# Patient Record
Sex: MALE | Race: WHITE | Employment: STUDENT | URBAN - METROPOLITAN AREA
[De-identification: names, ages, dates, MRNs, and addresses within clinical notes are randomized per-mention and may not be internally consistent; named-entity substitution may affect disease eponyms.]

---

## 2019-05-17 ENCOUNTER — HOSPITAL ENCOUNTER (INPATIENT)
Facility: CLINIC | Age: 24
LOS: 6 days | Discharge: HOME | End: 2019-05-24
Attending: INTERNAL MEDICINE | Admitting: PSYCHIATRY & NEUROLOGY
Payer: COMMERCIAL

## 2019-05-17 DIAGNOSIS — F11.20 HEROIN DEPENDENCE (H): ICD-10-CM

## 2019-05-17 DIAGNOSIS — F11.10 HEROIN ABUSE (H): ICD-10-CM

## 2019-05-17 DIAGNOSIS — F41.8 DEPRESSION WITH ANXIETY: Primary | ICD-10-CM

## 2019-05-17 DIAGNOSIS — L03.114 CELLULITIS OF LEFT HAND: ICD-10-CM

## 2019-05-17 LAB
ALBUMIN SERPL-MCNC: 3.3 G/DL (ref 3.4–5)
ALBUMIN UR-MCNC: 100 MG/DL
ALP SERPL-CCNC: 102 U/L (ref 40–150)
ALT SERPL W P-5'-P-CCNC: 54 U/L (ref 0–70)
AMPHETAMINES UR QL SCN: NEGATIVE
ANION GAP SERPL CALCULATED.3IONS-SCNC: 10 MMOL/L (ref 3–14)
APPEARANCE UR: ABNORMAL
AST SERPL W P-5'-P-CCNC: 81 U/L (ref 0–45)
BARBITURATES UR QL: NEGATIVE
BASOPHILS # BLD AUTO: 0 10E9/L (ref 0–0.2)
BASOPHILS NFR BLD AUTO: 0.1 %
BENZODIAZ UR QL: NEGATIVE
BILIRUB SERPL-MCNC: 1 MG/DL (ref 0.2–1.3)
BILIRUB UR QL STRIP: NEGATIVE
BUN SERPL-MCNC: 29 MG/DL (ref 7–30)
CALCIUM SERPL-MCNC: 10.1 MG/DL (ref 8.5–10.1)
CANNABINOIDS UR QL SCN: POSITIVE
CHLORIDE SERPL-SCNC: 81 MMOL/L (ref 94–109)
CO2 SERPL-SCNC: 36 MMOL/L (ref 20–32)
COCAINE UR QL: NEGATIVE
COLOR UR AUTO: YELLOW
CREAT SERPL-MCNC: 1.13 MG/DL (ref 0.66–1.25)
DIFFERENTIAL METHOD BLD: ABNORMAL
EOSINOPHIL # BLD AUTO: 0 10E9/L (ref 0–0.7)
EOSINOPHIL NFR BLD AUTO: 0 %
ERYTHROCYTE [DISTWIDTH] IN BLOOD BY AUTOMATED COUNT: 12.6 % (ref 10–15)
ETHANOL UR QL SCN: NEGATIVE
GFR SERPL CREATININE-BSD FRML MDRD: >90 ML/MIN/{1.73_M2}
GLUCOSE SERPL-MCNC: 115 MG/DL (ref 70–99)
GLUCOSE UR STRIP-MCNC: NEGATIVE MG/DL
GRAN CASTS #/AREA URNS LPF: 5 /LPF
HCT VFR BLD AUTO: 42.9 % (ref 40–53)
HGB BLD-MCNC: 14.8 G/DL (ref 13.3–17.7)
HGB UR QL STRIP: ABNORMAL
HYALINE CASTS #/AREA URNS LPF: 30 /LPF (ref 0–2)
IMM GRANULOCYTES # BLD: 0 10E9/L (ref 0–0.4)
IMM GRANULOCYTES NFR BLD: 0.3 %
KETONES UR STRIP-MCNC: 5 MG/DL
LEUKOCYTE ESTERASE UR QL STRIP: NEGATIVE
LYMPHOCYTES # BLD AUTO: 1 10E9/L (ref 0.8–5.3)
LYMPHOCYTES NFR BLD AUTO: 7.6 %
MCH RBC QN AUTO: 30.2 PG (ref 26.5–33)
MCHC RBC AUTO-ENTMCNC: 34.5 G/DL (ref 31.5–36.5)
MCV RBC AUTO: 88 FL (ref 78–100)
MONOCYTES # BLD AUTO: 0.2 10E9/L (ref 0–1.3)
MONOCYTES NFR BLD AUTO: 1.8 %
MUCOUS THREADS #/AREA URNS LPF: PRESENT /LPF
NEUTROPHILS # BLD AUTO: 11.9 10E9/L (ref 1.6–8.3)
NEUTROPHILS NFR BLD AUTO: 90.2 %
NITRATE UR QL: NEGATIVE
NRBC # BLD AUTO: 0 10*3/UL
NRBC BLD AUTO-RTO: 0 /100
OPIATES UR QL SCN: POSITIVE
PH UR STRIP: 5.5 PH (ref 5–7)
PLATELET # BLD AUTO: 416 10E9/L (ref 150–450)
POTASSIUM SERPL-SCNC: 3.2 MMOL/L (ref 3.4–5.3)
PROT SERPL-MCNC: 10 G/DL (ref 6.8–8.8)
RBC # BLD AUTO: 4.9 10E12/L (ref 4.4–5.9)
RBC #/AREA URNS AUTO: 6 /HPF (ref 0–2)
SODIUM SERPL-SCNC: 127 MMOL/L (ref 133–144)
SOURCE: ABNORMAL
SP GR UR STRIP: 1.02 (ref 1–1.03)
SQUAMOUS #/AREA URNS AUTO: 1 /HPF (ref 0–1)
UROBILINOGEN UR STRIP-MCNC: 2 MG/DL (ref 0–2)
WBC # BLD AUTO: 13.2 10E9/L (ref 4–11)
WBC #/AREA URNS AUTO: 28 /HPF (ref 0–5)
WBC CASTS #/AREA URNS LPF: 25 /LPF

## 2019-05-17 PROCEDURE — 80320 DRUG SCREEN QUANTALCOHOLS: CPT | Performed by: INTERNAL MEDICINE

## 2019-05-17 PROCEDURE — 80307 DRUG TEST PRSMV CHEM ANLYZR: CPT | Performed by: INTERNAL MEDICINE

## 2019-05-17 PROCEDURE — 99285 EMERGENCY DEPT VISIT HI MDM: CPT | Mod: 25 | Performed by: INTERNAL MEDICINE

## 2019-05-17 PROCEDURE — 81001 URINALYSIS AUTO W/SCOPE: CPT | Performed by: INTERNAL MEDICINE

## 2019-05-17 PROCEDURE — 76882 US LMTD JT/FCL EVL NVASC XTR: CPT | Performed by: INTERNAL MEDICINE

## 2019-05-17 PROCEDURE — 96375 TX/PRO/DX INJ NEW DRUG ADDON: CPT | Performed by: INTERNAL MEDICINE

## 2019-05-17 PROCEDURE — 25000128 H RX IP 250 OP 636: Performed by: INTERNAL MEDICINE

## 2019-05-17 PROCEDURE — 10060 I&D ABSCESS SIMPLE/SINGLE: CPT | Mod: 59 | Performed by: INTERNAL MEDICINE

## 2019-05-17 PROCEDURE — 80053 COMPREHEN METABOLIC PANEL: CPT | Performed by: INTERNAL MEDICINE

## 2019-05-17 PROCEDURE — 25000132 ZZH RX MED GY IP 250 OP 250 PS 637: Performed by: INTERNAL MEDICINE

## 2019-05-17 PROCEDURE — 96365 THER/PROPH/DIAG IV INF INIT: CPT | Performed by: INTERNAL MEDICINE

## 2019-05-17 PROCEDURE — 85025 COMPLETE CBC W/AUTO DIFF WBC: CPT | Performed by: INTERNAL MEDICINE

## 2019-05-17 PROCEDURE — 96366 THER/PROPH/DIAG IV INF ADDON: CPT | Performed by: INTERNAL MEDICINE

## 2019-05-17 PROCEDURE — HZ2ZZZZ DETOXIFICATION SERVICES FOR SUBSTANCE ABUSE TREATMENT: ICD-10-PCS | Performed by: PSYCHIATRY & NEUROLOGY

## 2019-05-17 PROCEDURE — 76882 US LMTD JT/FCL EVL NVASC XTR: CPT | Mod: 26 | Performed by: INTERNAL MEDICINE

## 2019-05-17 PROCEDURE — 0J9K3ZZ DRAINAGE OF LEFT HAND SUBCUTANEOUS TISSUE AND FASCIA, PERCUTANEOUS APPROACH: ICD-10-PCS | Performed by: INTERNAL MEDICINE

## 2019-05-17 PROCEDURE — 25000125 ZZHC RX 250

## 2019-05-17 RX ORDER — LIDOCAINE HYDROCHLORIDE AND EPINEPHRINE 10; 10 MG/ML; UG/ML
INJECTION, SOLUTION INFILTRATION; PERINEURAL
Status: COMPLETED
Start: 2019-05-17 | End: 2019-05-17

## 2019-05-17 RX ORDER — LIDOCAINE HYDROCHLORIDE AND EPINEPHRINE 10; 10 MG/ML; UG/ML
30 INJECTION, SOLUTION INFILTRATION; PERINEURAL ONCE
Status: DISCONTINUED | OUTPATIENT
Start: 2019-05-17 | End: 2019-05-17 | Stop reason: RX

## 2019-05-17 RX ORDER — IBUPROFEN 600 MG/1
600 TABLET, FILM COATED ORAL ONCE
Status: COMPLETED | OUTPATIENT
Start: 2019-05-17 | End: 2019-05-17

## 2019-05-17 RX ORDER — CEFAZOLIN SODIUM 2 G/100ML
2 INJECTION, SOLUTION INTRAVENOUS ONCE
Status: COMPLETED | OUTPATIENT
Start: 2019-05-17 | End: 2019-05-17

## 2019-05-17 RX ORDER — ACETAMINOPHEN 325 MG/1
650 TABLET ORAL EVERY 4 HOURS PRN
Status: DISCONTINUED | OUTPATIENT
Start: 2019-05-17 | End: 2019-05-17

## 2019-05-17 RX ORDER — ONDANSETRON 2 MG/ML
4 INJECTION INTRAMUSCULAR; INTRAVENOUS ONCE
Status: DISCONTINUED | OUTPATIENT
Start: 2019-05-17 | End: 2019-05-17

## 2019-05-17 RX ORDER — ONDANSETRON 2 MG/ML
4 INJECTION INTRAMUSCULAR; INTRAVENOUS ONCE
Status: COMPLETED | OUTPATIENT
Start: 2019-05-17 | End: 2019-05-17

## 2019-05-17 RX ORDER — ACETAMINOPHEN 325 MG/1
650 TABLET ORAL EVERY 4 HOURS PRN
Status: DISCONTINUED | OUTPATIENT
Start: 2019-05-17 | End: 2019-05-18

## 2019-05-17 RX ORDER — LIDOCAINE HYDROCHLORIDE AND EPINEPHRINE 10; 10 MG/ML; UG/ML
1 INJECTION, SOLUTION INFILTRATION; PERINEURAL ONCE
Status: DISCONTINUED | OUTPATIENT
Start: 2019-05-17 | End: 2019-05-17 | Stop reason: RX

## 2019-05-17 RX ORDER — CEPHALEXIN 500 MG/1
500 CAPSULE ORAL EVERY 6 HOURS SCHEDULED
Status: DISCONTINUED | OUTPATIENT
Start: 2019-05-18 | End: 2019-05-24 | Stop reason: HOSPADM

## 2019-05-17 RX ORDER — ONDANSETRON 8 MG/1
8 TABLET, ORALLY DISINTEGRATING ORAL ONCE
Status: COMPLETED | OUTPATIENT
Start: 2019-05-17 | End: 2019-05-18

## 2019-05-17 RX ORDER — ACETAMINOPHEN 325 MG/1
650 TABLET ORAL ONCE
Status: COMPLETED | OUTPATIENT
Start: 2019-05-17 | End: 2019-05-17

## 2019-05-17 RX ADMIN — ACETAMINOPHEN 650 MG: 325 TABLET, FILM COATED ORAL at 23:58

## 2019-05-17 RX ADMIN — IBUPROFEN 600 MG: 600 TABLET ORAL at 21:04

## 2019-05-17 RX ADMIN — ACETAMINOPHEN 650 MG: 325 TABLET, FILM COATED ORAL at 21:04

## 2019-05-17 RX ADMIN — IBUPROFEN 600 MG: 600 TABLET ORAL at 23:59

## 2019-05-17 RX ADMIN — CEFAZOLIN SODIUM 2 G: 2 INJECTION, SOLUTION INTRAVENOUS at 18:12

## 2019-05-17 RX ADMIN — ONDANSETRON 4 MG: 2 INJECTION INTRAMUSCULAR; INTRAVENOUS at 18:14

## 2019-05-17 RX ADMIN — CEPHALEXIN 500 MG: 500 CAPSULE ORAL at 23:57

## 2019-05-17 RX ADMIN — LIDOCAINE HYDROCHLORIDE AND EPINEPHRINE: 10; 10 INJECTION, SOLUTION INFILTRATION; PERINEURAL at 18:30

## 2019-05-17 ASSESSMENT — ENCOUNTER SYMPTOMS
DYSPHORIC MOOD: 1
NAUSEA: 1

## 2019-05-17 NOTE — ED PROVIDER NOTES
[facility] [place] ([facility])    5/17/19     [facility] 2 5:10 PM   History     Chief Complaint   Patient presents with     Addiction Problem     seeking detox from IV heroin abuse, last used this am.      The history is provided by the patient and medical records.     John Ojeda is a 23 year old male who presents seeking opiate detox. He has a history of heroin use x 2-3 years. He has been using approximately 1 gram a day IV with occasional Suboxone use. He did call ahead for detox be and has one on hold. As for physical complaints he notes that for past week he has had left hand redness and swelling around injection site. He is not currently on any antibiotics. No allergies to antibiotics. He feels helpless and hopeless but no suicidal ideation or plan. He is experiencing some withdrawal symptoms, has nausea at this time. Patient's last Tdap was 4 months ago.     I have reviewed the Medications, Allergies, Past Medical and Surgical History, and Social History in the [employer] system.  PAST MEDICAL HISTORY: History reviewed. No pertinent past medical history.    PAST SURGICAL HISTORY: History reviewed. No pertinent surgical history.    FAMILY HISTORY: History reviewed. No pertinent family history.    SOCIAL HISTORY:   Social History     Tobacco Use     Smoking status: Never Smoker     Smokeless tobacco: Never Used   Substance Use Topics     Alcohol use: Yes       Patient's Medications   New Prescriptions    No medications on file   Previous Medications    BUPRENORPHINE-NALOXONE (ZUBSOLV) 5.7-1.4 MG SUBLINGUAL TABLET    Place 1 tablet under the tongue 2 times daily   Modified Medications    No medications on file   Discontinued Medications    No medications on file        No Known Allergies     Review of Systems   Gastrointestinal: Positive for nausea.   Psychiatric/Behavioral: Positive for dysphoric mood. Negative for suicidal ideas.   All other systems reviewed and are negative.    Physical  Exam   BP: 145/85  Pulse: 136  Temp: 99.9  F (37.7  C)  Resp: 16  Weight: 87.5 kg (193 lb)  SpO2: 94 %    Physical Exam   Constitutional: He is oriented to person, place, and time. No distress.   HENT:   Head: Atraumatic.   Mouth/Throat: Oropharynx is clear and moist.   Eyes: Pupils are equal, round, and reactive to light. No scleral icterus.   Neck: Neck supple. No JVD present.   Cardiovascular: Regular rhythm, normal heart sounds and intact distal pulses. Tachycardia present. Exam reveals no gallop and no friction rub.   No murmur heard.  Pulmonary/Chest: Effort normal and breath sounds normal. No stridor. No respiratory distress. He has no wheezes. He has no rales. He exhibits no tenderness.   Abdominal: Soft. Bowel sounds are normal. He exhibits no distension and no mass. There is no tenderness. There is no rebound and no guarding. No hernia.   Musculoskeletal: He exhibits no edema or tenderness.   Neurological: He is alert and oriented to person, place, and time. No cranial nerve deficit.   Skin: Skin is warm. Lesion noted. No rash noted. He is not diaphoretic. There is erythema.            ED Course        Incision + drainage  Date/Time: 5/17/2019 10:51 PM  Performed by: Vladimir Morfin MD  Authorized by: Vladimir Morfin MD     Consent:     Consent obtained:  Verbal    Consent given by:  Patient    Risks discussed:  Bleeding    Alternatives discussed:  No treatment  Location:     Type:  Abscess    Location:  Upper extremity    Upper extremity location:  Hand    Hand location:  L hand  Pre-procedure details:     Skin preparation:  Betadine  Anesthesia (see MAR for exact dosages):     Anesthesia method:  Local infiltration    Local anesthetic:  Lidocaine 1% WITH epi  Procedure type:     Complexity:  Simple  Procedure details:     Incision types:  Single straight    Incision depth:  Dermal    Scalpel blade:  11    Wound management:  Probed and deloculated    Drainage:  Purulent    Drainage amount:  Scant    Wound  treatment:  Wound left open    Packing materials:  None  Post-procedure details:     Patient tolerance of procedure:  Tolerated well, no immediate complications      Results for orders placed during the hospital encounter of 05/17/19   POC US SOFT TISSUE    Impression Limited Soft Tissue Ultrasound, performed and interpreted by me.    Indication:  Skin redness warmth pain. Evaluate for cellulitis vs abscess.     Body location: left upper extremity    Findings:  There is cobblestoning suggestive of cellulitis in the evaluated area. There is a fluid collection measuring to suggest abscess. No foreign body identified    IMPRESSION: Abscess and Cellulitis            Results for orders placed or performed during the hospital encounter of 05/17/19 (from the past 24 hour(s))   POC US SOFT TISSUE     Status: None    Collection Time: 05/17/19  5:12 PM    Impression    Limited Soft Tissue Ultrasound, performed and interpreted by me.    Indication:  Skin redness warmth pain. Evaluate for cellulitis vs abscess.     Body location: left upper extremity    Findings:  There is cobblestoning suggestive of cellulitis in the evaluated area. There is a fluid collection measuring to suggest abscess. No foreign body identified    IMPRESSION: Abscess and Cellulitis       CBC with platelets differential     Status: Abnormal    Collection Time: 05/17/19  6:17 PM   Result Value Ref Range    WBC 13.2 (H) 4.0 - 11.0 10e9/L    RBC Count 4.90 4.4 - 5.9 10e12/L    Hemoglobin 14.8 13.3 - 17.7 g/dL    Hematocrit 42.9 40.0 - 53.0 %    MCV 88 78 - 100 fl    MCH 30.2 26.5 - 33.0 pg    MCHC 34.5 31.5 - 36.5 g/dL    RDW 12.6 10.0 - 15.0 %    Platelet Count 416 150 - 450 10e9/L    Diff Method Automated Method     % Neutrophils 90.2 %    % Lymphocytes 7.6 %    % Monocytes 1.8 %    % Eosinophils 0.0 %    % Basophils 0.1 %    % Immature Granulocytes 0.3 %    Nucleated RBCs 0 0 /100    Absolute Neutrophil 11.9 (H) 1.6 - 8.3 10e9/L    Absolute Lymphocytes 1.0  0.8 - 5.3 10e9/L    Absolute Monocytes 0.2 0.0 - 1.3 10e9/L    Absolute Eosinophils 0.0 0.0 - 0.7 10e9/L    Absolute Basophils 0.0 0.0 - 0.2 10e9/L    Abs Immature Granulocytes 0.0 0 - 0.4 10e9/L    Absolute Nucleated RBC 0.0    Comprehensive metabolic panel     Status: Abnormal    Collection Time: 05/17/19  6:17 PM   Result Value Ref Range    Sodium 127 (L) 133 - 144 mmol/L    Potassium 3.2 (L) 3.4 - 5.3 mmol/L    Chloride 81 (L) 94 - 109 mmol/L    Carbon Dioxide 36 (H) 20 - 32 mmol/L    Anion Gap 10 3 - 14 mmol/L    Glucose 115 (H) 70 - 99 mg/dL    Urea Nitrogen 29 7 - 30 mg/dL    Creatinine 1.13 0.66 - 1.25 mg/dL    GFR Estimate >90 >60 mL/min/[1.73_m2]    GFR Estimate If Black >90 >60 mL/min/[1.73_m2]    Calcium 10.1 8.5 - 10.1 mg/dL    Bilirubin Total 1.0 0.2 - 1.3 mg/dL    Albumin 3.3 (L) 3.4 - 5.0 g/dL    Protein Total 10.0 (H) 6.8 - 8.8 g/dL    Alkaline Phosphatase 102 40 - 150 U/L    ALT 54 0 - 70 U/L    AST 81 (H) 0 - 45 U/L   Drug abuse screen 6 urine (tox)     Status: Abnormal    Collection Time: 05/17/19  8:43 PM   Result Value Ref Range    Amphetamine Qual Urine Negative NEG^Negative    Barbiturates Qual Urine Negative NEG^Negative    Benzodiazepine Qual Urine Negative NEG^Negative    Cannabinoids Qual Urine Positive (A) NEG^Negative    Cocaine Qual Urine Negative NEG^Negative    Ethanol Qual Urine Negative NEG^Negative    Opiates Qualitative Urine Positive (A) NEG^Negative   UA with Microscopic     Status: Abnormal    Collection Time: 05/17/19  8:43 PM   Result Value Ref Range    Color Urine Yellow     Appearance Urine Slightly Cloudy     Glucose Urine Negative NEG^Negative mg/dL    Bilirubin Urine Negative NEG^Negative    Ketones Urine 5 (A) NEG^Negative mg/dL    Specific Gravity Urine 1.025 1.003 - 1.035    Blood Urine Trace (A) NEG^Negative    pH Urine 5.5 5.0 - 7.0 pH    Protein Albumin Urine 100 (A) NEG^Negative mg/dL    Urobilinogen mg/dL 2.0 0.0 - 2.0 mg/dL    Nitrite Urine Negative  NEG^Negative    Leukocyte Esterase Urine Negative NEG^Negative    Source Midstream Urine     WBC Urine 28 (H) 0 - 5 /HPF    RBC Urine 6 (H) 0 - 2 /HPF    Squamous Epithelial /HPF Urine 1 0 - 1 /HPF    Mucous Urine Present (A) NEG^Negative /LPF    Hyaline Casts 30 (H) 0 - 2 /LPF    Wbc Cast 25 (A) NEG^Negative /LPF    Granular Casts 5 (A) NEG^Negative /LPF           Results for orders placed or performed during the hospital encounter of 05/17/19 (from the past 24 hour(s))   POC US SOFT TISSUE    Impression    Limited Soft Tissue Ultrasound, performed and interpreted by me.    Indication:  Skin redness warmth pain. Evaluate for cellulitis vs abscess.     Body location: left upper extremity    Findings:  There is cobblestoning suggestive of cellulitis in the evaluated area. There is a fluid collection measuring to suggest abscess. No foreign body identified    IMPRESSION: Abscess and Cellulitis       CBC with platelets differential   Result Value Ref Range    WBC 13.2 (H) 4.0 - 11.0 10e9/L    RBC Count 4.90 4.4 - 5.9 10e12/L    Hemoglobin 14.8 13.3 - 17.7 g/dL    Hematocrit 42.9 40.0 - 53.0 %    MCV 88 78 - 100 fl    MCH 30.2 26.5 - 33.0 pg    MCHC 34.5 31.5 - 36.5 g/dL    RDW 12.6 10.0 - 15.0 %    Platelet Count 416 150 - 450 10e9/L    Diff Method Automated Method     % Neutrophils 90.2 %    % Lymphocytes 7.6 %    % Monocytes 1.8 %    % Eosinophils 0.0 %    % Basophils 0.1 %    % Immature Granulocytes 0.3 %    Nucleated RBCs 0 0 /100    Absolute Neutrophil 11.9 (H) 1.6 - 8.3 10e9/L    Absolute Lymphocytes 1.0 0.8 - 5.3 10e9/L    Absolute Monocytes 0.2 0.0 - 1.3 10e9/L    Absolute Eosinophils 0.0 0.0 - 0.7 10e9/L    Absolute Basophils 0.0 0.0 - 0.2 10e9/L    Abs Immature Granulocytes 0.0 0 - 0.4 10e9/L    Absolute Nucleated RBC 0.0    Comprehensive metabolic panel   Result Value Ref Range    Sodium 127 (L) 133 - 144 mmol/L    Potassium 3.2 (L) 3.4 - 5.3 mmol/L    Chloride 81 (L) 94 - 109 mmol/L    Carbon Dioxide 36 (H)  20 - 32 mmol/L    Anion Gap 10 3 - 14 mmol/L    Glucose 115 (H) 70 - 99 mg/dL    Urea Nitrogen 29 7 - 30 mg/dL    Creatinine 1.13 0.66 - 1.25 mg/dL    GFR Estimate >90 >60 mL/min/[1.73_m2]    GFR Estimate If Black >90 >60 mL/min/[1.73_m2]    Calcium 10.1 8.5 - 10.1 mg/dL    Bilirubin Total 1.0 0.2 - 1.3 mg/dL    Albumin 3.3 (L) 3.4 - 5.0 g/dL    Protein Total 10.0 (H) 6.8 - 8.8 g/dL    Alkaline Phosphatase 102 40 - 150 U/L    ALT 54 0 - 70 U/L    AST 81 (H) 0 - 45 U/L   Drug abuse screen 6 urine (tox)   Result Value Ref Range    Amphetamine Qual Urine Negative NEG^Negative    Barbiturates Qual Urine Negative NEG^Negative    Benzodiazepine Qual Urine Negative NEG^Negative    Cannabinoids Qual Urine Positive (A) NEG^Negative    Cocaine Qual Urine Negative NEG^Negative    Ethanol Qual Urine Negative NEG^Negative    Opiates Qualitative Urine Positive (A) NEG^Negative   UA with Microscopic   Result Value Ref Range    Color Urine Yellow     Appearance Urine Slightly Cloudy     Glucose Urine Negative NEG^Negative mg/dL    Bilirubin Urine Negative NEG^Negative    Ketones Urine 5 (A) NEG^Negative mg/dL    Specific Gravity Urine 1.025 1.003 - 1.035    Blood Urine Trace (A) NEG^Negative    pH Urine 5.5 5.0 - 7.0 pH    Protein Albumin Urine 100 (A) NEG^Negative mg/dL    Urobilinogen mg/dL 2.0 0.0 - 2.0 mg/dL    Nitrite Urine Negative NEG^Negative    Leukocyte Esterase Urine Negative NEG^Negative    Source Midstream Urine     WBC Urine 28 (H) 0 - 5 /HPF    RBC Urine 6 (H) 0 - 2 /HPF    Squamous Epithelial /HPF Urine 1 0 - 1 /HPF    Mucous Urine Present (A) NEG^Negative /LPF    Hyaline Casts 30 (H) 0 - 2 /LPF    Wbc Cast 25 (A) NEG^Negative /LPF    Granular Casts 5 (A) NEG^Negative /LPF        Medications   ondansetron (ZOFRAN-ODT) ODT tab 8 mg (8 mg Oral Not Given 5/17/19 1820)   cephALEXin (KEFLEX) capsule 500 mg (has no administration in time range)   ceFAZolin (ANCEF) intermittent infusion 2 g in 100 mL dextrose PRE-MIX (2 g  Intravenous Given 5/17/19 1812)   ondansetron (ZOFRAN) injection 4 mg (4 mg Intravenous Given 5/17/19 1814)   lidocaine 1% with EPINEPHrine 1:100,000 1 %-1:[id number] injection (  Given 5/17/19 1830)   ibuprofen (ADVIL/MOTRIN) tablet 600 mg (600 mg Oral Given 5/17/19 2104)   acetaminophen (TYLENOL) tablet 650 mg (650 mg Oral Given 5/17/19 2104)         Assessments & Plan (with Medical Decision Making)  Heroine abuse and dependence, upto one gram a day with IV, last use this am now mild withdraw with nausea, no SI, admit to Detox.  Left hand abscess/celluliits-ancef 2 gram and I and D done, continue keflex for 7 days.       I have reviewed the nursing notes.    I have reviewed the findings, diagnosis, plan and need for follow up with the patient.       Medication List      There are no discharge medications for this visit.         Final diagnoses:   Heroin abuse (H)   Heroin dependence (H)   Cellulitis of left hand     I, Pat Espinosa, am serving as a trained medical scribe to document services personally performed by Vladimir Morfin MD based on the provider's statements to me on May 17, 2019.  This document has been checked and approved by the attending provider.    I, Vladimir Morfin MD, was physically present and have reviewed and verified the accuracy of this note documented by Pat Espinosa, medical scribe.     5/17/2019   [facility], [place], EMERGENCY DEPARTMENT     Vladimir Morfin MD  05/17/19 [id number]

## 2019-05-18 PROBLEM — F19.10 SUBSTANCE ABUSE (H): Status: ACTIVE | Noted: 2019-05-18

## 2019-05-18 PROCEDURE — 99222 1ST HOSP IP/OBS MODERATE 55: CPT | Mod: AI | Performed by: PSYCHIATRY & NEUROLOGY

## 2019-05-18 PROCEDURE — H2035 A/D TX PROGRAM, PER HOUR: HCPCS | Mod: HQ

## 2019-05-18 PROCEDURE — 25000132 ZZH RX MED GY IP 250 OP 250 PS 637: Performed by: INTERNAL MEDICINE

## 2019-05-18 PROCEDURE — 25000132 ZZH RX MED GY IP 250 OP 250 PS 637: Performed by: PSYCHIATRY & NEUROLOGY

## 2019-05-18 PROCEDURE — 12800008 ZZH R&B CD ADULT

## 2019-05-18 PROCEDURE — 25000131 ZZH RX MED GY IP 250 OP 636 PS 637: Performed by: INTERNAL MEDICINE

## 2019-05-18 PROCEDURE — 25000131 ZZH RX MED GY IP 250 OP 636 PS 637: Performed by: PSYCHIATRY & NEUROLOGY

## 2019-05-18 RX ORDER — ONDANSETRON 4 MG/1
4 TABLET, ORALLY DISINTEGRATING ORAL EVERY 6 HOURS PRN
Status: DISCONTINUED | OUTPATIENT
Start: 2019-05-18 | End: 2019-05-18

## 2019-05-18 RX ORDER — CLONIDINE HYDROCHLORIDE 0.1 MG/1
0.1 TABLET ORAL 3 TIMES DAILY PRN
Status: DISCONTINUED | OUTPATIENT
Start: 2019-05-18 | End: 2019-05-24 | Stop reason: HOSPADM

## 2019-05-18 RX ORDER — ACETAMINOPHEN 325 MG/1
650 TABLET ORAL EVERY 4 HOURS PRN
Status: DISCONTINUED | OUTPATIENT
Start: 2019-05-18 | End: 2019-05-24 | Stop reason: HOSPADM

## 2019-05-18 RX ORDER — ONDANSETRON 4 MG/1
4-8 TABLET, ORALLY DISINTEGRATING ORAL EVERY 8 HOURS PRN
Status: DISCONTINUED | OUTPATIENT
Start: 2019-05-18 | End: 2019-05-24 | Stop reason: HOSPADM

## 2019-05-18 RX ORDER — BUPRENORPHINE 2 MG/1
4 TABLET SUBLINGUAL 3 TIMES DAILY
Status: DISCONTINUED | OUTPATIENT
Start: 2019-05-18 | End: 2019-05-24 | Stop reason: HOSPADM

## 2019-05-18 RX ORDER — CLONIDINE HYDROCHLORIDE 0.1 MG/1
0.1 TABLET ORAL 2 TIMES DAILY PRN
Status: DISCONTINUED | OUTPATIENT
Start: 2019-05-18 | End: 2019-05-18

## 2019-05-18 RX ORDER — BISACODYL 10 MG
10 SUPPOSITORY, RECTAL RECTAL DAILY PRN
Status: DISCONTINUED | OUTPATIENT
Start: 2019-05-18 | End: 2019-05-24 | Stop reason: HOSPADM

## 2019-05-18 RX ORDER — POTASSIUM CHLORIDE 750 MG/1
40 TABLET, EXTENDED RELEASE ORAL ONCE
Status: COMPLETED | OUTPATIENT
Start: 2019-05-18 | End: 2019-05-18

## 2019-05-18 RX ORDER — IBUPROFEN 600 MG/1
600 TABLET, FILM COATED ORAL EVERY 6 HOURS PRN
Status: DISCONTINUED | OUTPATIENT
Start: 2019-05-18 | End: 2019-05-24 | Stop reason: HOSPADM

## 2019-05-18 RX ORDER — HYDROXYZINE HYDROCHLORIDE 25 MG/1
25 TABLET, FILM COATED ORAL EVERY 4 HOURS PRN
Status: DISCONTINUED | OUTPATIENT
Start: 2019-05-18 | End: 2019-05-24 | Stop reason: HOSPADM

## 2019-05-18 RX ORDER — TRAZODONE HYDROCHLORIDE 50 MG/1
50 TABLET, FILM COATED ORAL
Status: DISCONTINUED | OUTPATIENT
Start: 2019-05-18 | End: 2019-05-24 | Stop reason: HOSPADM

## 2019-05-18 RX ORDER — BUPRENORPHINE 2 MG/1
4 TABLET SUBLINGUAL 2 TIMES DAILY
Status: DISCONTINUED | OUTPATIENT
Start: 2019-05-18 | End: 2019-05-18

## 2019-05-18 RX ORDER — ALUMINA, MAGNESIA, AND SIMETHICONE 2400; 2400; 240 MG/30ML; MG/30ML; MG/30ML
30 SUSPENSION ORAL EVERY 4 HOURS PRN
Status: DISCONTINUED | OUTPATIENT
Start: 2019-05-18 | End: 2019-05-24 | Stop reason: HOSPADM

## 2019-05-18 RX ADMIN — BUPRENORPHINE HYDROCHLORIDE 4 MG: 2 TABLET SUBLINGUAL at 20:24

## 2019-05-18 RX ADMIN — ACETAMINOPHEN 650 MG: 325 TABLET, FILM COATED ORAL at 20:24

## 2019-05-18 RX ADMIN — ONDANSETRON 8 MG: 8 TABLET, ORALLY DISINTEGRATING ORAL at 00:15

## 2019-05-18 RX ADMIN — CEPHALEXIN 500 MG: 500 CAPSULE ORAL at 23:49

## 2019-05-18 RX ADMIN — CEPHALEXIN 500 MG: 500 CAPSULE ORAL at 06:00

## 2019-05-18 RX ADMIN — BUPRENORPHINE HYDROCHLORIDE 4 MG: 2 TABLET SUBLINGUAL at 16:21

## 2019-05-18 RX ADMIN — BUPRENORPHINE HCL 4 MG: 2 TABLET SUBLINGUAL at 12:29

## 2019-05-18 RX ADMIN — HYDROXYZINE HYDROCHLORIDE 25 MG: 25 TABLET ORAL at 16:22

## 2019-05-18 RX ADMIN — CEPHALEXIN 500 MG: 500 CAPSULE ORAL at 20:24

## 2019-05-18 RX ADMIN — ONDANSETRON 4 MG: 4 TABLET, ORALLY DISINTEGRATING ORAL at 10:12

## 2019-05-18 RX ADMIN — ONDANSETRON 8 MG: 4 TABLET, ORALLY DISINTEGRATING ORAL at 13:59

## 2019-05-18 RX ADMIN — HYDROXYZINE HYDROCHLORIDE 25 MG: 25 TABLET ORAL at 20:24

## 2019-05-18 RX ADMIN — POTASSIUM CHLORIDE 40 MEQ: 750 TABLET, EXTENDED RELEASE ORAL at 01:13

## 2019-05-18 RX ADMIN — CEPHALEXIN 500 MG: 500 CAPSULE ORAL at 12:30

## 2019-05-18 ASSESSMENT — ACTIVITIES OF DAILY LIVING (ADL)
RETIRED_COMMUNICATION: 0-->UNDERSTANDS/COMMUNICATES WITHOUT DIFFICULTY
TOILETING: 0-->INDEPENDENT
BATHING: 0-->INDEPENDENT
FALL_HISTORY_WITHIN_LAST_SIX_MONTHS: NO
SWALLOWING: 0-->SWALLOWS FOODS/LIQUIDS WITHOUT DIFFICULTY
HYGIENE/GROOMING: INDEPENDENT
AMBULATION: 0-->INDEPENDENT
TRANSFERRING: 0-->INDEPENDENT
COGNITION: 0 - NO COGNITION ISSUES REPORTED
RETIRED_EATING: 0-->INDEPENDENT
DRESS: 0-->INDEPENDENT
ORAL_HYGIENE: INDEPENDENT

## 2019-05-18 ASSESSMENT — MIFFLIN-ST. JEOR: SCORE: 1896.64

## 2019-05-18 NOTE — PLAN OF CARE
S: Patient is a 23 year old male admitted for detox from Heroin.    B: Patient was admitted from our ED for Heroin abuse and detox. Patient said his primary drug of choice is heroin. He said he uses 1 gm of IV heroin daily and last use was on 5/17/2019 around 12:00 pm. His other drug choice include marijuana. He said he smokes marijuana daily and last use was also on 5 /17/2019 around 12:00 pm. He is also a smoker who smokes 1 pack of cigarrettes a day. Patient denied having any medical history. However, patient did have a abscess on his right hand which was drained in the ED and was ordered antibiotics for it. Area on Left hand is opened, dry, and swollen measuring L 1cm x W 0.4 cm. It was covered with Curity band-aid. He is here voluntarily.    A: Admission interview was completed. Doctor was called and updated. New orders were placed (see orders). Nicotine gum/lozenge were not ordered because patient said did not want any medical interventions at the moment. He said will let staff know if he would want it ordered in the future.     R: Continue to monitor and provide interventions for withdrawals. Will update if there are changes.

## 2019-05-18 NOTE — PHARMACY-ADMISSION MEDICATION HISTORY
"Admission medication history for the May 17, 2019 admission has been completed by pharmacy.     Interview sources:  patient, [place]  Record    Reliability of source: good, consistent with radha romano     Medication compliance: poor, been off \"for a while\"     Preferred Outpatient Pharmacy: [facility]    Changes made to PTA medication list (reason)  Added: Zubsolv    Additional medication history information:   [place] :  1) Zubsolv 5.7-1.4mg sublingual tablets dispensed on 4-12-19 for quantity #60 tablets (30 day supply)     Prior to Admission Medication List:  Prior to Admission medications    Medication Sig Last Dose Taking? Auth Provider   buprenorphine-naloxone (ZUBSOLV) 5.7-1.4 MG sublingual tablet Place 1 tablet under the tongue 2 times daily not taking  Unknown, Entered By History       Time spent: 15 minutes    Medication history completed by:   Lise Patel, PharmD, BCPP  [facility]  Available daily from 1-9 PM: phone [phone], ascom *[phone], pager [phone]    "

## 2019-05-18 NOTE — PROGRESS NOTES
Pt vomiting green bile 100 ml. Sea Bands applied. See order to increase Zofran. Pt will be receiving first dose of buprenorphine

## 2019-05-18 NOTE — PROGRESS NOTES
"CLINICAL NUTRITION SERVICES - ASSESSMENT NOTE     Nutrition Prescription    RECOMMENDATIONS FOR MDs/PROVIDERS TO ORDER:  -Monitor nausea control and potential need for adjustment in antinausea regimen.    Malnutrition Status:    -At least non-severe malnutrition in the context of acute illness.      Recommendations already ordered by Registered Dietitian (RD):  -None today    Future/Additional Recommendations:  -Monitor po intakes and tolerance. If po continues decreased, offer scheduled snacks or supplements.       REASON FOR ASSESSMENT  John Ojeda is a/an 23 year old male assessed by the dietitian for Admission Nutrition Risk Screen for reduced oral intake over the last month    NUTRITION HISTORY  Per pt has not eaten much for the past 1-2 weeks.  He states prior to this was missing some meals.  Pt resting in room-unable to obtain detailed nutrition hx.  No known food allergies.      CURRENT NUTRITION ORDERS  Diet: Regular  Intake/Tolerance:  Per chart review, pt experiencing nausea and emesis intermittently today.  He states will try to eat something later.        LABS  Labs reviewed  (5/17)  Na 127 (L)  K 3.4 (L)    MEDICATIONS  Medications reviewed  Per chart review, received Potassium Chloride   Zofran prn    ANTHROPOMETRICS  Height: 180.3 cm (5' 11\")  Most Recent Weight: 88.5 kg (195 lb)    IBW: 172 lbs  BMI: Overweight BMI 25-29.9  Weight History:   Per pt UBW is 210-215 lbs.  He states has lost 20 lbs  In the past 1-2 weeks.   No weight hx available to confirm.    Wt Readings from Last 10 Encounters:   05/18/19 88.5 kg (195 lb)       Dosing Weight: 89 kg    ASSESSED NUTRITION NEEDS  Estimated Energy Needs: 5077-2280 kcals/day (25 - 30 kcals/kg)  Justification: Maintenance  Estimated Protein Needs:  grams protein/day (1 - 1.2 grams of pro/kg)  Justification: Repletion/healing  Estimated Fluid Needs:  (1 mL/kcal)   Justification: Per provider pending fluid status    PHYSICAL FINDINGS  See " malnutrition section below.  Left hand abscess/celluliits    MALNUTRITION  % Intake: <75% for >7 days  % Weight Loss: >2% x 1 week  Subcutaneous Fat Loss: None observed  Muscle Loss: None observed  Fluid Accumulation/Edema: None noted  Malnutrition Diagnosis: At least non-severe malnutrition in the context of acute illness.      NUTRITION DIAGNOSIS  Inadequate oral intake related to decreased appetite with substance use and now N/V 2/2 w/d as evidenced by pt report of minimal po intakes over the past 1-2 weeks and severe weight loss.      INTERVENTIONS  Implementation  Nutrition Education:  Discussed option of nutrition supplements such as Boost Plus, but pt declined at this time.  Discussed foods better tolerated with N/V and snacks available on unit.      Goals  Patient to consume % of nutritionally adequate meal trays TID, or the equivalent with supplements/snacks.     Monitoring/Evaluation  Progress toward goals will be monitored and evaluated per protocol.    Jessica Johnson RD, LD

## 2019-05-18 NOTE — PROGRESS NOTES
Patient cow score 11 at 1600 and did feel better overall. Rested in bed and then at 1745 came to the desk to c/o feeling a wave of anxiety and VSS an 02 sats WNL. Did agree that he was sort of not breathing deeply and felt anxious. Was encouraged to go back to bed and to try to do deep slow breathing and was shown how. Did take his tray down to his room to eat.

## 2019-05-18 NOTE — PROGRESS NOTES
Case Management Note  5/18/2019    Briefly met with pt to discuss discharge plans. Pt reported he is not sure yet. He would like either treatment, sober living, or to return home. Patient is ill currently. Writer encouraged pt to think about what he'd like to do and seek out CM to talk further when he is feeling better. Pt understands.     Maira Ren, EITAN, [facility]

## 2019-05-18 NOTE — PROGRESS NOTES
05/18/19 0152   Patient Belongings   Did you bring any home meds/supplements to the hospital?  No   Patient Belongings remains with patient;other (see comments)   Patient Belongings Remaining with Patient shoes;clothing   Belongings Search Yes   Clothing Search Yes   Second Staff Vasu BRADSHAW   Comment None    Tote: belt, jacket, hat, grey shorts (2), green shorts, toiletries, lighter, keys, backpack  Bin: cell phone, wallet,   Security [id number]: social security card, [employer] ID, (2) mastercard, club m card, chick[employer]christiano-a card, mall [employer] badge, [employer] card, [place] drivers license, $462 cash  A               Admission:  I am responsible for any personal items that are not sent to the safe or pharmacy.  [facility] is not responsible for loss, theft or damage of any property in my possession.    Signature:  _________________________________ Date: _______  Time: _____                                              Staff Signature:  ____________________________ Date: ________  Time: _____      2nd Staff person, if patient is unable/unwilling to sign:    Signature: ________________________________ Date: ________  Time: _____     Discharge:  [facility] has returned all of my personal belongings:    Signature: _________________________________ Date: ________  Time: _____                                          Staff Signature:  ____________________________ Date: ________  Time: _____

## 2019-05-18 NOTE — PROGRESS NOTES
"Best practice has been constantly popping up with a message stating that \"vitals meets criteria for immediate notification of Internal Medicine for mandatory assessment within 30 minutes...\" And to notify Psychiatry as well. However, patient's vitals on this unit were /75   Pulse 68   Temp 98.6  F (37  C) (Oral)   Resp 16   Wt 87.5 kg (193 lb)   SpO2 91%   And did not show immediate notification of Internal Medicine and Psychiatry. Vitals previously in the ED were   Temp 97.9 F, pulse 66, /72 and SpO2 94% on room air.     Will continue to monitor and update if there are changes.  "

## 2019-05-18 NOTE — ED NOTES
ED to Behavioral Floor Handoff    SITUATION  John Ojeda is a 23 year old male who speaks English and lives in a home with girlfriend. The patient arrived in the ED by private car from home with a complaint of Addiction Problem (seeking detox from IV heroin abuse, last used this am. )  .The patient's current symptoms started/worsened 4 year(s) ago and during this time the symptoms have increased.   In the ED, pt was diagnosed with   Final diagnoses:   Heroin abuse (H)   Heroin dependence (H)   Cellulitis of left hand        Initial vitals were: BP: 145/85  Pulse: 136  Temp: 99.9  F (37.7  C)  Resp: 16  Weight: 87.5 kg (193 lb)  SpO2: 94 %   --------  Is the patient diabetic? No   If yes, last blood glucose? --     If yes, was this treated in the ED? --  --------  Is the patient inebriated (ETOH) No or Impaired on other substances? No  MSSA done? N/A  Last MSSA score: --    Were withdrawal symptoms treated? N/A  Does the patient have a seizure history? No. If yes, date of most recent seizure--  --------  Is the patient patient experiencing suicidal ideation? denies current or recent suicidal ideation     Homicidal ideation? denies current or recent homicidal ideation or behaviors.    Self-injurious behavior/urges? denies current or recent self injurious behavior or ideation.  ------  Was pt aggressive in the ED No  Was a code called No  Is the pt now cooperative? Yes  -------  Meds given in ED:   Medications   ondansetron (ZOFRAN-ODT) ODT tab 8 mg (8 mg Oral Not Given 5/17/19 1820)   ceFAZolin (ANCEF) intermittent infusion 2 g in 100 mL dextrose PRE-MIX (2 g Intravenous Given 5/17/19 1812)   ondansetron (ZOFRAN) injection 4 mg (4 mg Intravenous Given 5/17/19 1814)   lidocaine 1% with EPINEPHrine 1:100,000 1 %-1:[id number] injection (  Given 5/17/19 1830)   ibuprofen (ADVIL/MOTRIN) tablet 600 mg (600 mg Oral Given 5/17/19 2104)   acetaminophen (TYLENOL) tablet 650 mg (650 mg Oral Given 5/17/19 3756)      Family present  during ED course? No  Family currently present? No    BACKGROUND  Does the patient have a cognitive impairment or developmental disability? No  Allergies: No Known Allergies.   Social demographics are   Social History     Socioeconomic History     Marital status: Single     Spouse name: Not on file     Number of children: Not on file     Years of education: Not on file     Highest education level: Not on file   Occupational History     Not on file   Social Needs     Financial resource strain: Not on file     Food insecurity:     Worry: Not on file     Inability: Not on file     Transportation needs:     Medical: Not on file     Non-medical: Not on file   Tobacco Use     Smoking status: Not on file   Substance and Sexual Activity     Alcohol use: Not on file     Drug use: Not on file     Sexual activity: Not on file   Lifestyle     Physical activity:     Days per week: Not on file     Minutes per session: Not on file     Stress: Not on file   Relationships     Social connections:     Talks on phone: Not on file     Gets together: Not on file     Attends Anglican service: Not on file     Active member of club or organization: Not on file     Attends meetings of clubs or organizations: Not on file     Relationship status: Not on file     Intimate partner violence:     Fear of current or ex partner: Not on file     Emotionally abused: Not on file     Physically abused: Not on file     Forced sexual activity: Not on file   Other Topics Concern     Not on file   Social History Narrative     Not on file        ASSESSMENT  Labs results   Labs Ordered and Resulted from Time of ED Arrival Up to the Time of Departure from the ED   CBC WITH PLATELETS DIFFERENTIAL - Abnormal; Notable for the following components:       Result Value    WBC 13.2 (*)     Absolute Neutrophil 11.9 (*)     All other components within normal limits   COMPREHENSIVE METABOLIC PANEL - Abnormal; Notable for the following components:    Sodium 127 (*)      Potassium 3.2 (*)     Chloride 81 (*)     Carbon Dioxide 36 (*)     Glucose 115 (*)     Albumin 3.3 (*)     Protein Total 10.0 (*)     AST 81 (*)     All other components within normal limits   DRUG ABUSE SCREEN 6 CHEM DEP URINE ([facility])   ROUTINE UA WITH MICROSCOPIC      Imaging Studies:   Recent Results (from the past 24 hour(s))   POC US SOFT TISSUE    Impression    Limited Soft Tissue Ultrasound, performed and interpreted by me.    Indication:  Skin redness warmth pain. Evaluate for cellulitis vs abscess.     Body location: left upper extremity    Findings:  There is cobblestoning suggestive of cellulitis in the evaluated area. There is a fluid collection measuring to suggest abscess. No foreign body identified    IMPRESSION: Abscess and Cellulitis          Most recent vital signs BP (!) 149/91   Pulse 108   Temp 100.2  F (37.9  C) (Oral)   Resp 18   Wt 87.5 kg (193 lb)   SpO2 94%    Abnormal labs/tests/findings requiring intervention:---   Pain control: fair  Nausea control: fair    RECOMMENDATION  Are any infection precautions needed (MRSA, VRE, etc.)? No If yes, what infection? --  ---  Does the patient have mobility issues? independently. If yes, what device does the pt use? ---  ---  Is patient on 72 hour hold or commitment? No If on 72 hour hold, have hold and rights been given to patient? N/A  Are admitting orders written if after 10 p.m. ?N/A  Tasks needing to be completed:---     Komal Perez   ascom--    [phone] [facility]   [phone] [facility]

## 2019-05-18 NOTE — PLAN OF CARE
Pt reports less opiate withdrawal symptoms after receiving first dose of buprenorphine. Pt will received buprenorphine 4 mg TID this shift. Pt in bed. Nausea and vomiting off and on today. Second dose of Zofran given. Pt states he is still deciding on what to do after detox. Wants to talk to his parents. Left hand swollen. Pt has bandaid on. Is on keflex.

## 2019-05-18 NOTE — H&P
"Admitted:     05/17/2019      DATE OF SERVICE:  05/18/2019.      LEGAL STATUS AND REASON FOR ADMISSION:  The patient was admitted on a voluntary status after presenting to the Emergency Department seeking admission for opioid detox and is interested in a referral to CD treatment.      SOURCES FOR INTAKE:  The patient's interview and review of available medical records.      CHIEF COMPLAINT:  \"Heroin, wanted to get sober off of it.\"      HISTORY OF PRESENT ILLNESS:  John Ojeda is a 23-year-old  male with history of chemical dependency who presented to the Emergency Department seeking admission for heroin detoxification and is interested in referral to CD treatment.  The patient tells me that he lives with his girlfriend.  He works as a  at a restaurant.  He is currently in diversion program but does not have a .  He has been in treatment 4-5 times.  He completed all of them except for the first program.  More recently he was at [facility] about 1-1/2 years ago.  He received Suboxone maintenance through Dr. March's clinic at [facility].      The patient smokes about a pack of tobacco per day.  He was sober of heroin and marijuana for 16 months.  No history of cocaine or methamphetamine use.  He has not been taking his Suboxone consistently.  He tells me at times he skips it so he could get high on heroin.  He started actually abusing narcotics when he was 17 years old and transitioned to heroin by the age of 19.  He has been using via IV route, but does not share needles.  He actually has an abscess on his left arm.  No accidental overdoses reported.  He has been using up to a gram of heroin almost daily.  He is interested in Suboxone maintenance.  He is contemplating between referral for outpatient versus residential CD treatment.      PSYCHIATRIC HISTORY:  The patient saw a psychiatrist when he was attending CD treatment at [facility].  He does not have a therapist.  He has had " "remote history of alcohol bingeing but none recently.  No withdrawals and does not believe alcohol is a problem.  He has been diagnosed with \"drug-induced depression\", been on some antidepressants in the past, did not find them helpful and believes he needs them.  No prior suicidal attempts or self-harming behavior.  No prior inpatient hospitalization for mental illness.      In terms of mood symptoms and despite ongoing active substance use, the patient denied feeling depressed.  He reported some anxiety but mainly in the context of inability to maintain sobriety.  His sleep and appetite have been poor.  Energy, motivation, concentration and focus fluctuate and correlates greatly to heroin use.  He is hopeful.  Denied thought of suicide and urges to self-harm.  He reported no symptoms or history related to malinda, psychosis, PTSD, OCD or eating disorder.      PAST MEDICAL HISTORY:  The patient denies history of head trauma, concussion and seizures.  He underwent 2 wisdom teeth extractions.  He has an abscess on his left upper extremity at the site of IV drug use.  He has no medical allergies.      FAMILY HISTORY:  The patient tells me that his biological father has bipolar disorder, depression, anxiety and chemical dependency.      SOCIAL HISTORY:  The patient grew up in [place], [place], was raised by his mother who remarried when he was in high school.  He grew up with 5 siblings.  He graduated high school on time, attended 3 years of college, majoring in business.  No childhood abuse or neglect.  He is not .  He has no kids.  He currently lives with his girlfriend and works as a .  He is currently in a diversion program, does not have a .      PHYSICAL EXAMINATION:  Please refer to the physical exam done by Vladimir Morfin MD, done on 05/17/2019.      REVIEW OF SYSTEMS:  A 12-point review of systems was obtained and negative.  The patient reported that nausea and urges to vomit " "have subsided significantly since he received a first dose of Subutex.  Continues to feel tired and lethargic with some body ache, otherwise negative.      LABORATORY DATA:  Urine drug screen was positive for cannabinoids and opioids.  CBC and CMP were within normal limits except for sodium of 127, potassium 3.2, chloride 81, bicarbonate 36, albumin 3.3, total protein 10.0, AST 81, glucose 115, WBC 13.2 and absolute neutrophil 11.9.  UA was significant for bilirubin, protein, trace nitrate, WBC, RBC, mucus, Hyaline casts, granulated casts and WBC cast.      VITAL SIGNS ON ADMISSION:  Temperature 99, heart rate 76, blood pressure 127/76, respiratory rate 16.      PSYCHIATRIC EXAMINATION:  A 23-year-old  male appears his stated age, cooperative, pleasant and engaged, established good rapport and eye contact.  No major psychomotor abnormality, tics or tremors were noted except for mild restlessness.  Speech was normal pace, volume and clarity.  Gait and muscle tone within normal limits.  Mood described as \"better\" with full reactive and engaged affect.  His thought process linear and goal directed.  Thought content:  Denied current thoughts of suicide or urges of self-harm.  Denied hallucination and perceptual disturbances.  No paranoia or grandiosity looseness of association noted.  Sensorium was clear.  He was oriented to time, place, person and situation.  Immediate and remote memory intact.  Language and fund of knowledge adequate for age and level of training.  Concentration and focus intact.  Insight and judgment fair for safety at the current level of care.      DIAGNOSES:   1.  Opioid use disorder -- severe, with active withdrawals.     2.  Cannabis use disorder -- severe.   3.  Rule out substance-induced mood disorder.   4.  Cellulitis at the site of IV injection with possible abscess.      PLAN AND RECOMMENDATIONS:  The patient was admitted to detox on a voluntary status after presenting to the " Emergency Department seeking admission for opioid detox and referral to CD treatment.  The patient would like to resume Suboxone maintenance through Dr. March at [facility].  He is contemplating referral to residential CD treatment versus outpatient.      After reviewing for post-treatment plan and medication profile, patient agreed to the followin.  The patient was placed on opiate withdrawal protocol with Subutex taper starting at 4 mg b.i.d. due to elevated withdrawal symptoms and likely secondary to heavy use.  Subutex will being increased to 4 mg t.i.d.  The patient is interested in transitioning to Suboxone maintenance.  Will likely be issued on discharge at 4 versus 8 mg b.i.d.   2.  P.r.n. hydroxyzine for anxiety, clonidine for breakthrough withdrawals, and trazodone for insomnia.      Internal Medicine consult was obtained to address physical complaint and for health maintenance.  Psychosocial assessment was obtained by our clinical  who will assist with setting up post-discharge care.  The patient will need to complete CD assessment.      DISPOSITION:  To be discussed in future encounters.  The patient will be granted discharge when established safety in the community and completed detoxification.         ES CASTRO MD             D: 2019   T: 2019   MT: MI      Name:     DENIA TYLER   MRN:      [id number]        Account:      [id number]   :      1995        Admitted:     2019                   Document: [id number]       cc: Da March MD

## 2019-05-19 LAB
ALBUMIN SERPL-MCNC: 2.7 G/DL (ref 3.4–5)
ALBUMIN UR-MCNC: NEGATIVE MG/DL
ALP SERPL-CCNC: 78 U/L (ref 40–150)
ALT SERPL W P-5'-P-CCNC: 45 U/L (ref 0–70)
ANION GAP SERPL CALCULATED.3IONS-SCNC: 9 MMOL/L (ref 3–14)
APPEARANCE UR: CLEAR
AST SERPL W P-5'-P-CCNC: 58 U/L (ref 0–45)
BASOPHILS # BLD AUTO: 0 10E9/L (ref 0–0.2)
BASOPHILS NFR BLD AUTO: 0.1 %
BILIRUB SERPL-MCNC: 0.6 MG/DL (ref 0.2–1.3)
BILIRUB UR QL STRIP: NEGATIVE
BUN SERPL-MCNC: 20 MG/DL (ref 7–30)
CALCIUM SERPL-MCNC: 9.3 MG/DL (ref 8.5–10.1)
CHLORIDE SERPL-SCNC: 84 MMOL/L (ref 94–109)
CHOLEST SERPL-MCNC: 125 MG/DL
CO2 SERPL-SCNC: 37 MMOL/L (ref 20–32)
COLOR UR AUTO: YELLOW
CREAT SERPL-MCNC: 0.91 MG/DL (ref 0.66–1.25)
DIFFERENTIAL METHOD BLD: ABNORMAL
EOSINOPHIL # BLD AUTO: 0 10E9/L (ref 0–0.7)
EOSINOPHIL NFR BLD AUTO: 0.5 %
ERYTHROCYTE [DISTWIDTH] IN BLOOD BY AUTOMATED COUNT: 12.5 % (ref 10–15)
GFR SERPL CREATININE-BSD FRML MDRD: >90 ML/MIN/{1.73_M2}
GGT SERPL-CCNC: 63 U/L (ref 0–75)
GLUCOSE SERPL-MCNC: 106 MG/DL (ref 70–99)
GLUCOSE UR STRIP-MCNC: NEGATIVE MG/DL
HCT VFR BLD AUTO: 39.9 % (ref 40–53)
HDLC SERPL-MCNC: 21 MG/DL
HGB BLD-MCNC: 13.7 G/DL (ref 13.3–17.7)
HGB UR QL STRIP: NEGATIVE
IMM GRANULOCYTES # BLD: 0 10E9/L (ref 0–0.4)
IMM GRANULOCYTES NFR BLD: 0.3 %
KETONES UR STRIP-MCNC: 5 MG/DL
LDLC SERPL CALC-MCNC: 80 MG/DL
LEUKOCYTE ESTERASE UR QL STRIP: NEGATIVE
LYMPHOCYTES # BLD AUTO: 1.2 10E9/L (ref 0.8–5.3)
LYMPHOCYTES NFR BLD AUTO: 15.4 %
MCH RBC QN AUTO: 29.9 PG (ref 26.5–33)
MCHC RBC AUTO-ENTMCNC: 34.3 G/DL (ref 31.5–36.5)
MCV RBC AUTO: 87 FL (ref 78–100)
MONOCYTES # BLD AUTO: 0.2 10E9/L (ref 0–1.3)
MONOCYTES NFR BLD AUTO: 2 %
MUCOUS THREADS #/AREA URNS LPF: PRESENT /LPF
NEUTROPHILS # BLD AUTO: 6.1 10E9/L (ref 1.6–8.3)
NEUTROPHILS NFR BLD AUTO: 81.7 %
NITRATE UR QL: NEGATIVE
NONHDLC SERPL-MCNC: 104 MG/DL
NRBC # BLD AUTO: 0 10*3/UL
NRBC BLD AUTO-RTO: 0 /100
PH UR STRIP: 5.5 PH (ref 5–7)
PLATELET # BLD AUTO: 378 10E9/L (ref 150–450)
POTASSIUM SERPL-SCNC: 3.3 MMOL/L (ref 3.4–5.3)
PROT SERPL-MCNC: 8.5 G/DL (ref 6.8–8.8)
RBC # BLD AUTO: 4.58 10E12/L (ref 4.4–5.9)
RBC #/AREA URNS AUTO: <1 /HPF (ref 0–2)
SODIUM SERPL-SCNC: 130 MMOL/L (ref 133–144)
SOURCE: ABNORMAL
SP GR UR STRIP: 1.01 (ref 1–1.03)
SQUAMOUS #/AREA URNS AUTO: <1 /HPF (ref 0–1)
TRIGL SERPL-MCNC: 120 MG/DL
TSH SERPL DL<=0.005 MIU/L-ACNC: 0.64 MU/L (ref 0.4–4)
UROBILINOGEN UR STRIP-MCNC: NORMAL MG/DL (ref 0–2)
WBC # BLD AUTO: 7.5 10E9/L (ref 4–11)
WBC #/AREA URNS AUTO: 14 /HPF (ref 0–5)

## 2019-05-19 PROCEDURE — 25000131 ZZH RX MED GY IP 250 OP 636 PS 637: Performed by: PSYCHIATRY & NEUROLOGY

## 2019-05-19 PROCEDURE — 87389 HIV-1 AG W/HIV-1&-2 AB AG IA: CPT | Performed by: PSYCHIATRY & NEUROLOGY

## 2019-05-19 PROCEDURE — 25000132 ZZH RX MED GY IP 250 OP 250 PS 637: Performed by: INTERNAL MEDICINE

## 2019-05-19 PROCEDURE — 87086 URINE CULTURE/COLONY COUNT: CPT | Performed by: PHYSICIAN ASSISTANT

## 2019-05-19 PROCEDURE — 99207 ZZC CONSULT E&M CHANGED TO INITIAL LEVEL: CPT | Performed by: PHYSICIAN ASSISTANT

## 2019-05-19 PROCEDURE — 25000128 H RX IP 250 OP 636: Performed by: PHYSICIAN ASSISTANT

## 2019-05-19 PROCEDURE — 36415 COLL VENOUS BLD VENIPUNCTURE: CPT | Performed by: PSYCHIATRY & NEUROLOGY

## 2019-05-19 PROCEDURE — 80061 LIPID PANEL: CPT | Performed by: PSYCHIATRY & NEUROLOGY

## 2019-05-19 PROCEDURE — 25000132 ZZH RX MED GY IP 250 OP 250 PS 637: Performed by: PHYSICIAN ASSISTANT

## 2019-05-19 PROCEDURE — 80053 COMPREHEN METABOLIC PANEL: CPT | Performed by: PSYCHIATRY & NEUROLOGY

## 2019-05-19 PROCEDURE — 84443 ASSAY THYROID STIM HORMONE: CPT | Performed by: PSYCHIATRY & NEUROLOGY

## 2019-05-19 PROCEDURE — 82977 ASSAY OF GGT: CPT | Performed by: PSYCHIATRY & NEUROLOGY

## 2019-05-19 PROCEDURE — 87086 URINE CULTURE/COLONY COUNT: CPT | Performed by: PSYCHIATRY & NEUROLOGY

## 2019-05-19 PROCEDURE — 12800008 ZZH R&B CD ADULT

## 2019-05-19 PROCEDURE — 81001 URINALYSIS AUTO W/SCOPE: CPT | Performed by: PHYSICIAN ASSISTANT

## 2019-05-19 PROCEDURE — 25000132 ZZH RX MED GY IP 250 OP 250 PS 637: Performed by: PSYCHIATRY & NEUROLOGY

## 2019-05-19 PROCEDURE — 85025 COMPLETE CBC W/AUTO DIFF WBC: CPT | Performed by: PSYCHIATRY & NEUROLOGY

## 2019-05-19 PROCEDURE — 99222 1ST HOSP IP/OBS MODERATE 55: CPT | Performed by: PHYSICIAN ASSISTANT

## 2019-05-19 PROCEDURE — 86803 HEPATITIS C AB TEST: CPT | Performed by: PSYCHIATRY & NEUROLOGY

## 2019-05-19 RX ORDER — POTASSIUM CHLORIDE 1.5 G/1.58G
60 POWDER, FOR SOLUTION ORAL ONCE
Status: COMPLETED | OUTPATIENT
Start: 2019-05-19 | End: 2019-05-19

## 2019-05-19 RX ORDER — POTASSIUM CHLORIDE 1500 MG/1
60 TABLET, EXTENDED RELEASE ORAL ONCE
Status: COMPLETED | OUTPATIENT
Start: 2019-05-19 | End: 2019-05-19

## 2019-05-19 RX ORDER — PROCHLORPERAZINE 25 MG
25 SUPPOSITORY, RECTAL RECTAL EVERY 12 HOURS PRN
Status: DISCONTINUED | OUTPATIENT
Start: 2019-05-19 | End: 2019-05-24 | Stop reason: HOSPADM

## 2019-05-19 RX ORDER — LIDOCAINE 40 MG/G
CREAM TOPICAL
Status: DISCONTINUED | OUTPATIENT
Start: 2019-05-19 | End: 2019-05-24 | Stop reason: HOSPADM

## 2019-05-19 RX ORDER — PROCHLORPERAZINE MALEATE 5 MG
5-10 TABLET ORAL EVERY 6 HOURS PRN
Status: DISCONTINUED | OUTPATIENT
Start: 2019-05-19 | End: 2019-05-24 | Stop reason: HOSPADM

## 2019-05-19 RX ADMIN — ONDANSETRON 8 MG: 4 TABLET, ORALLY DISINTEGRATING ORAL at 05:13

## 2019-05-19 RX ADMIN — HYDROXYZINE HYDROCHLORIDE 25 MG: 25 TABLET ORAL at 16:32

## 2019-05-19 RX ADMIN — POTASSIUM CHLORIDE 60 MEQ: 1.5 POWDER, FOR SOLUTION ORAL at 10:19

## 2019-05-19 RX ADMIN — BUPRENORPHINE HYDROCHLORIDE 4 MG: 2 TABLET SUBLINGUAL at 07:55

## 2019-05-19 RX ADMIN — HYDROXYZINE HYDROCHLORIDE 25 MG: 25 TABLET ORAL at 20:26

## 2019-05-19 RX ADMIN — POTASSIUM CHLORIDE 60 MEQ: 20 TABLET, EXTENDED RELEASE ORAL at 15:22

## 2019-05-19 RX ADMIN — SODIUM CHLORIDE 1000 ML: 9 INJECTION, SOLUTION INTRAVENOUS at 10:57

## 2019-05-19 RX ADMIN — CEPHALEXIN 500 MG: 500 CAPSULE ORAL at 18:11

## 2019-05-19 RX ADMIN — CEPHALEXIN 500 MG: 500 CAPSULE ORAL at 13:17

## 2019-05-19 RX ADMIN — ONDANSETRON 8 MG: 4 TABLET, ORALLY DISINTEGRATING ORAL at 18:11

## 2019-05-19 RX ADMIN — PROCHLORPERAZINE EDISYLATE 10 MG: 5 INJECTION INTRAMUSCULAR; INTRAVENOUS at 14:15

## 2019-05-19 RX ADMIN — ACETAMINOPHEN 650 MG: 325 TABLET, FILM COATED ORAL at 16:32

## 2019-05-19 RX ADMIN — CEPHALEXIN 500 MG: 500 CAPSULE ORAL at 05:54

## 2019-05-19 RX ADMIN — BUPRENORPHINE HYDROCHLORIDE 4 MG: 2 TABLET SUBLINGUAL at 20:26

## 2019-05-19 RX ADMIN — BUPRENORPHINE HYDROCHLORIDE 4 MG: 2 TABLET SUBLINGUAL at 13:17

## 2019-05-19 RX ADMIN — ACETAMINOPHEN 650 MG: 325 TABLET, FILM COATED ORAL at 20:26

## 2019-05-19 ASSESSMENT — ACTIVITIES OF DAILY LIVING (ADL)
ORAL_HYGIENE: INDEPENDENT
HYGIENE/GROOMING: INDEPENDENT
DRESS: INDEPENDENT
HYGIENE/GROOMING: SHOWER;INDEPENDENT
ORAL_HYGIENE: INDEPENDENT
LAUNDRY: WITH SUPERVISION

## 2019-05-19 NOTE — CONSULTS
[facility], [place]  Consult Note - Hospitalist Service       Date of Admission: 5/17/2019  Consult Requested by: Tanesha Batista  Reason for Consult: Opiate Detox, Electrolyte Abnormalities, Left Hand Cellulitis/Abscess    Assessment & Plan   John Ojeda is a 23 year old male with a history of opiate abuse who is admitted to  for opiate detoxification.    #Opiate Abuse with Severe Opiate Withdrawal - Using at least 1 g IV heroin daily PTA. Significant withdrawal symptoms including nausea with frequent emesis and minimal PO intake resulting in electrolyte abnormalities as discussed below. Denies any hx of HIV, Hepatitis B/C, or endocarditis.  - HIV and Hepatitis C testing ordered by Psychiatry. Please notify our team if assistance required with results.  - Start IM/PO Compazine PRN for n/v as Zofran only partially effective. Consider addition of Reglan if persistent symptoms.  - Remainder of cares per Psychiatry.    #Left Hand Cellulitis with Abscess s/p I&D (5/17/19) - Febrile to 101.2 with WBC 13.2 on 5/17. POC US in ED suggestive of cellulitis and abscess s/p bedside I&D. Received one dose IV Ancef 5/17, then transitioned to 7 day course of Keflex per ED provider. Currently afebrile w/o leukocytosis. Ongoing significant soft tissue swelling without focal induration on exam; I&D site with minimal surrounding erythema and scant purulent drainage.  - Continue Keflex 500 mg QID x 7 days  - Notify Medicine if febrile >100.4 and/or increased erythema, drainage, pain or swelling  - Medicine will re-evaluate L hand in 1-2 days to ensure improvement.     #Hyponatremia - Sodium 127 on 5/17 --> 130 on 5/19. Likely hypovolemic hyponatremia in setting of poor PO intake and GI losses (n/v).  - Give 1L IVF bolus and encourage PO fluids  - Trend BMP in AM.    #Metabolic Alkalosis - Bicarb 36-37 since admission. Likely due to nausea with vomiting +/- contraction alkalosis.   - Antiemetics and IVF as  "above.  - Trend BMP in AM.    #Abnormal UA - UA (5/17) with 5 ketones, 100 protein, trace blood, 28 WBC, 6 RBC, mucous present, 30 Hyaline casts, 5 granular casts, 25 WBC casts. Reports \"small amount\" of burning with urination.  - Unable to add on Urine Cx to previously obtained sample.  - Repeat UA/UC    #Hypokalemia - Likely due to poor PO intake and GI losses (n/v). Most recent K 3.3 on 5/19.  - Give 60 mEq KCl x 1  - Trend BMP in AM    #Mildly Elevated AST - AST 81 on admission --> 58 on recheck 5/19. Remainder of LFTs wnl. Unclear etiology.  - Repeat CMP per PCP within 1 month of discharge.    Medicine will follow up on repeat BMP, UA/UC, and left hand cellulitis/abscess.    Smita Ospina PA-C  Hospitalist  ______________________________________________________________________    Chief Complaint   Opiate Detox    History is obtained from the patient    History of Present Illness   John Ojeda is a 23 year old male with a history of opiate abuse who is admitted to  for opiate detoxification. Prior to admission, he was using at least 1 gram of heroin daily. Denies any history of HIV, Hepatitis B, Hepatitis C, or endocarditis. He was evaluated in the ED for left hand swelling with POC Soft Tissue US demonstrating cobblestoning suggestive of cellulitis and fluid collection suggestive of abscess. He underwent I&D and received one dose of IV Ancef prior to being started on a 7 day course of Keflex. He reports his left hand swelling and pain are gradually improving since I&D; no changes in redness and no appreciable drainage noted. Current withdrawal symptoms include diaphoresis, hot/cold flashes, nausea with frequent emesis (last this AM), dyspepsia, constipation, generalized pain and tingling, weakness, poor oral intake, and rhinorrhea. Aside from withdrawal, he reports a \"small amount\" of burning with urination.     Review of Systems   The 10 point Review of Systems is negative other than noted in the HPI or " here.     Past Medical History    I have reviewed this patient's medical history and updated it with pertinent information if needed.   Past Medical History:   Diagnosis Date     Substance abuse (H)        Past Surgical History   I have reviewed this patient's surgical history and updated it with pertinent information if needed.  Past Surgical History:   Procedure Laterality Date     EXTRACTION(S) DENTAL      [place] Teeth       Social History   I have reviewed this patient's social history and updated it with pertinent information if needed.  Social History     Tobacco Use     Smoking status: Current Every Day Smoker     Packs/day: 1.00     Smokeless tobacco: Never Used   Substance Use Topics     Alcohol use: Not Currently     Drug use: Yes     Types: IV, Opiates     Comment: 1 g IV Heroin daily       Family History   I have reviewed this patient's family history and updated it with pertinent information if needed.   Family History   Problem Relation Age of Onset     Cerebrovascular Disease Maternal Great-Grandmother      Coronary Artery Disease Maternal Great-Grandmother        Medications   Medications Prior to Admission   Medication Sig Dispense Refill Last Dose     buprenorphine-naloxone (ZUBSOLV) 5.7-1.4 MG sublingual tablet Place 1 tablet under the tongue 2 times daily   Past Month at Unknown time       Allergies   No Known Allergies    Physical Exam   Vital Signs: Temp: 97.1  F (36.2  C) Temp src: Oral BP: 142/90 Pulse: 89   Resp: 16 SpO2: 93 % O2 Device: None (Room air)    Weight: 195 lbs 0 oz    General: Awake. Non-toxic appearing. NAD.  HEENT: NC/AT. Anicteric sclera. Mucous membranes moist.  CV: RRR. No appreciable murmurs.  Pulmonary: Normal effort on RA. Lungs CTAB.  GI: Soft, non-tender, and non-distended with bowel sounds present.  Extremities: Significant soft tissue swelling of the left hand without focal induration. No additional areas of peripheral edema. Warm and well perfused.  Neuro: Alert.  Answers questions appropriately. Moves all extremities.  Skin: Left hand I&D site with scant purulent drainage and minimal surrounding erythema. No rashes or jaundice.    Data   Results for orders placed or performed during the hospital encounter of 05/17/19 (from the past 24 hour(s))   CBC with platelets differential   Result Value Ref Range    WBC 7.5 4.0 - 11.0 10e9/L    RBC Count 4.58 4.4 - 5.9 10e12/L    Hemoglobin 13.7 13.3 - 17.7 g/dL    Hematocrit 39.9 (L) 40.0 - 53.0 %    MCV 87 78 - 100 fl    MCH 29.9 26.5 - 33.0 pg    MCHC 34.3 31.5 - 36.5 g/dL    RDW 12.5 10.0 - 15.0 %    Platelet Count 378 150 - 450 10e9/L    Diff Method Automated Method     % Neutrophils 81.7 %    % Lymphocytes 15.4 %    % Monocytes 2.0 %    % Eosinophils 0.5 %    % Basophils 0.1 %    % Immature Granulocytes 0.3 %    Nucleated RBCs 0 0 /100    Absolute Neutrophil 6.1 1.6 - 8.3 10e9/L    Absolute Lymphocytes 1.2 0.8 - 5.3 10e9/L    Absolute Monocytes 0.2 0.0 - 1.3 10e9/L    Absolute Eosinophils 0.0 0.0 - 0.7 10e9/L    Absolute Basophils 0.0 0.0 - 0.2 10e9/L    Abs Immature Granulocytes 0.0 0 - 0.4 10e9/L    Absolute Nucleated RBC 0.0    Comprehensive metabolic panel   Result Value Ref Range    Sodium 130 (L) 133 - 144 mmol/L    Potassium 3.3 (L) 3.4 - 5.3 mmol/L    Chloride 84 (L) 94 - 109 mmol/L    Carbon Dioxide 37 (H) 20 - 32 mmol/L    Anion Gap 9 3 - 14 mmol/L    Glucose 106 (H) 70 - 99 mg/dL    Urea Nitrogen 20 7 - 30 mg/dL    Creatinine 0.91 0.66 - 1.25 mg/dL    GFR Estimate >90 >60 mL/min/[1.73_m2]    GFR Estimate If Black >90 >60 mL/min/[1.73_m2]    Calcium 9.3 8.5 - 10.1 mg/dL    Bilirubin Total 0.6 0.2 - 1.3 mg/dL    Albumin 2.7 (L) 3.4 - 5.0 g/dL    Protein Total 8.5 6.8 - 8.8 g/dL    Alkaline Phosphatase 78 40 - 150 U/L    ALT 45 0 - 70 U/L    AST 58 (H) 0 - 45 U/L   Lipid panel   Result Value Ref Range    Cholesterol 125 <200 mg/dL    Triglycerides 120 <150 mg/dL    HDL Cholesterol 21 (L) >39 mg/dL    LDL Cholesterol  Calculated 80 <100 mg/dL    Non HDL Cholesterol 104 <130 mg/dL   TSH with free T4 reflex and/or T3 as indicated   Result Value Ref Range    TSH 0.64 0.40 - 4.00 mU/L   GGT   Result Value Ref Range    GGT 63 0 - 75 U/L

## 2019-05-19 NOTE — PROGRESS NOTES
Pt's IV has completely run in . Pt reports the nausea has stopped. Pt reporting mild opiate withdrawal symptoms.

## 2019-05-19 NOTE — PROGRESS NOTES
"   05/18/19 5098   Group Therapy Session   Group Attendance attended group session   Total Time (minutes) 40   Group Type psychotherapeutic   Group Topic Covered   (mindfulness as coping strategy)   Patient Participation/Contribution cooperative with task   Patient participated in psychotherapy group which included a mindfulness activity focusing on the 5 senses and then processing as a group.  John reported feeling \"out of it.\" He remained in group for the full session, and actively listened to others in the group.    "

## 2019-05-19 NOTE — PLAN OF CARE
Problem: Substance Withdrawal  Goal: Substance Withdrawal  Description  Signs and symptoms of listed problems will be absent or manageable.  1. Detoxification from opiates using buprenorphine   2. Pt will complete assessment paperwork  3. Pt will meet with  for evaluation and discharge planning  4. Pt oral intake will be greater than 75% of meals to meet estimated needs  5. Pt will be provided opioid prevention resources  6. Physical examination by MD and Lab evaluation      Outcome: No Change  Pt being monitored for opiate withdrawal. Pt reporting mild opiate withdrawal symptoms. Body aches and chills. Main issue is N and V.   Pt has small emesis in am. Had received Zofran on night shift.   Pt seen by medicine. See order for potassium replacement, IV fluids and am labs.   Pt did not drink potassium. Medicine informed.  Pt also reporting increase pain in left hand where I & D had been done. BAndaid changed x 2  Area swollen. Ice packs applied x 2. Ice packs appear to reduce the swelling some. Pt declining pain medications at this time.   Temp 100 at noon. Pt has a visitor here now. Pt showered today. Fluids provided and encouraged.   Pt has been eating very small amounts. Pt requested and was given 10 mg IM compazine this afternoon.   Labs pending. Additional labs ordered for am.   Discharge plans pending. Pt states he may be interested in returning to [facility].   Continues on  oral antibiotics.         100

## 2019-05-19 NOTE — PROGRESS NOTES
Brief Medicine Note    Contacted by RN staff stating patient did not take any of the KCl packet this morning as he did not like the taste, but is now having improved n/v and would be able to tolerate KCl in pill form. Ordered 60 mEq of K-Dur and will repeat BMP in AM as initially planned. RN staff also note patient complaining of left hand pain at site of abscess s/p I&D and cellulitis which is improving with ice; declining Ibuprofen and Tylenol. Instructed staff to continue to monitor hand & our team will follow up for re-evaluation in 1-2 days as initially planned.    Smita Ospina PA-C  Hospitalist Service  [phone]

## 2019-05-20 LAB
ANION GAP SERPL CALCULATED.3IONS-SCNC: 8 MMOL/L (ref 3–14)
BACTERIA SPEC CULT: NO GROWTH
BUN SERPL-MCNC: 15 MG/DL (ref 7–30)
CALCIUM SERPL-MCNC: 9.2 MG/DL (ref 8.5–10.1)
CHLORIDE SERPL-SCNC: 90 MMOL/L (ref 94–109)
CO2 SERPL-SCNC: 32 MMOL/L (ref 20–32)
CREAT SERPL-MCNC: 0.89 MG/DL (ref 0.66–1.25)
CRP SERPL-MCNC: 134 MG/L (ref 0–8)
GFR SERPL CREATININE-BSD FRML MDRD: >90 ML/MIN/{1.73_M2}
GLUCOSE SERPL-MCNC: 100 MG/DL (ref 70–99)
HCV AB SERPL QL IA: NONREACTIVE
HIV 1+2 AB+HIV1 P24 AG SERPL QL IA: NONREACTIVE
Lab: NORMAL
MRSA DNA SPEC QL NAA+PROBE: NEGATIVE
POTASSIUM SERPL-SCNC: 3.6 MMOL/L (ref 3.4–5.3)
SODIUM SERPL-SCNC: 130 MMOL/L (ref 133–144)
SPECIMEN SOURCE: NORMAL
SPECIMEN SOURCE: NORMAL

## 2019-05-20 PROCEDURE — 36415 COLL VENOUS BLD VENIPUNCTURE: CPT | Performed by: PHYSICIAN ASSISTANT

## 2019-05-20 PROCEDURE — 86140 C-REACTIVE PROTEIN: CPT | Performed by: PHYSICIAN ASSISTANT

## 2019-05-20 PROCEDURE — 80048 BASIC METABOLIC PNL TOTAL CA: CPT | Performed by: PHYSICIAN ASSISTANT

## 2019-05-20 PROCEDURE — 25000132 ZZH RX MED GY IP 250 OP 250 PS 637: Performed by: PHYSICIAN ASSISTANT

## 2019-05-20 PROCEDURE — 99232 SBSQ HOSP IP/OBS MODERATE 35: CPT | Performed by: PSYCHIATRY & NEUROLOGY

## 2019-05-20 PROCEDURE — 87591 N.GONORRHOEAE DNA AMP PROB: CPT | Performed by: PHYSICIAN ASSISTANT

## 2019-05-20 PROCEDURE — H2035 A/D TX PROGRAM, PER HOUR: HCPCS | Mod: HQ

## 2019-05-20 PROCEDURE — 99232 SBSQ HOSP IP/OBS MODERATE 35: CPT | Performed by: PHYSICIAN ASSISTANT

## 2019-05-20 PROCEDURE — 25000132 ZZH RX MED GY IP 250 OP 250 PS 637: Performed by: PSYCHIATRY & NEUROLOGY

## 2019-05-20 PROCEDURE — 87641 MR-STAPH DNA AMP PROBE: CPT | Performed by: PHYSICIAN ASSISTANT

## 2019-05-20 PROCEDURE — 87640 STAPH A DNA AMP PROBE: CPT | Performed by: PHYSICIAN ASSISTANT

## 2019-05-20 PROCEDURE — 25000132 ZZH RX MED GY IP 250 OP 250 PS 637: Performed by: NURSE PRACTITIONER

## 2019-05-20 PROCEDURE — 12800008 ZZH R&B CD ADULT

## 2019-05-20 PROCEDURE — 87491 CHLMYD TRACH DNA AMP PROBE: CPT | Performed by: PHYSICIAN ASSISTANT

## 2019-05-20 RX ADMIN — HYDROXYZINE HYDROCHLORIDE 25 MG: 25 TABLET ORAL at 13:58

## 2019-05-20 RX ADMIN — IBUPROFEN 600 MG: 600 TABLET ORAL at 07:41

## 2019-05-20 RX ADMIN — BUPRENORPHINE HYDROCHLORIDE 4 MG: 2 TABLET SUBLINGUAL at 07:42

## 2019-05-20 RX ADMIN — TRAZODONE HYDROCHLORIDE 50 MG: 50 TABLET ORAL at 21:39

## 2019-05-20 RX ADMIN — IBUPROFEN 600 MG: 600 TABLET ORAL at 13:58

## 2019-05-20 RX ADMIN — CEPHALEXIN 500 MG: 500 CAPSULE ORAL at 05:25

## 2019-05-20 RX ADMIN — IBUPROFEN 600 MG: 600 TABLET ORAL at 20:42

## 2019-05-20 RX ADMIN — BUPRENORPHINE HYDROCHLORIDE 4 MG: 2 TABLET SUBLINGUAL at 18:52

## 2019-05-20 RX ADMIN — CEPHALEXIN 500 MG: 500 CAPSULE ORAL at 17:29

## 2019-05-20 RX ADMIN — CEPHALEXIN 500 MG: 500 CAPSULE ORAL at 12:31

## 2019-05-20 RX ADMIN — BUPRENORPHINE HYDROCHLORIDE 4 MG: 2 TABLET SUBLINGUAL at 13:58

## 2019-05-20 ASSESSMENT — ACTIVITIES OF DAILY LIVING (ADL)
LAUNDRY: WITH SUPERVISION
ORAL_HYGIENE: INDEPENDENT
HYGIENE/GROOMING: INDEPENDENT
DRESS: INDEPENDENT

## 2019-05-20 NOTE — PROGRESS NOTES
In bed much of shift. Cow scores 4 and 4. Given hydroxizine and tylenol x 2 for temp, aches and restlessness. Taking fluids well and only eating small amounts. Given zofran x 1 8 mg. Affect irritable. No emesis this shift. Recommended rebanage and ointment this pm as well as icing x 2 . Left hand swelling same as last pm. May have GI reaction to antibiotic. Also may have been uncertain or untruthful about last use of heroin and /or a relation to abscess..Temps 99.9 and 99.8 fluids and small snacks encouraged.

## 2019-05-20 NOTE — PLAN OF CARE
Pt continued to be monitored for opiate withdrawal. Pt's COW scores this shift were 7 and 5. Pt has an abscess on his left hand that is swollen, warm to the touch and painful. Patient states that he thinks the swelling is worse today and he is unable to close his hand and make a fist. Pt states that when I and D was performed in ED no drainage came out. Medicine notified and labs ordered including BMP, CRP and MRSA nasal swab. MRSA nasal swab collected and sent to lab. CRP resulted and elevated. Pt's affect is tense and anxious, possibly related to hand pain, PRN ibuprofen given for pain and swelling. Pt unsure of post discharge tx plans. Will continue to monitor.

## 2019-05-20 NOTE — PROGRESS NOTES
"Brief medicine follow up note:  - Pt feels L hand wound slightly worse in appearance today. Denies fever, chills, and lesions elsewhere. Remains on Keflex started in ED after abscess I&D.     GEN:  Non-toxic appearing man in NAD  Blood pressure (!) 129/91, pulse 90, temperature 99  F (37.2  C), resp. rate 16, height 1.803 m (5' 11\"), weight 88.5 kg (195 lb), SpO2 (!) 88 %.  SKIN: L hand I&D site with overlying purulent material. Surrounding skin tender, erythematous, edematous and indurated. Not able to express further purulence from wound.     ROUTINE LABS:  CMP  Recent Labs   Lab 05/20/19  0750 05/19/19  0840 05/17/19  1817   * 130* 127*   POTASSIUM 3.6 3.3* 3.2*   CHLORIDE 90* 84* 81*   CO2 32 37* 36*   ANIONGAP 8 9 10   * 106* 115*   BUN 15 20 29   CR 0.89 0.91 1.13   GFRESTIMATED >90 >90 >90   GFRESTBLACK >90 >90 >90   FLOR 9.2 9.3 10.1   PROTTOTAL  --  8.5 10.0*   ALBUMIN  --  2.7* 3.3*   BILITOTAL  --  0.6 1.0   ALKPHOS  --  78 102   AST  --  58* 81*   ALT  --  45 54     CBC  Recent Labs   Lab 05/19/19  0840 05/17/19  1817   WBC 7.5 13.2*   RBC 4.58 4.90   HGB 13.7 14.8   HCT 39.9* 42.9   MCV 87 88   MCH 29.9 30.2   MCHC 34.3 34.5   RDW 12.5 12.6    416     INRNo lab results found in last 7 days.  Arterial Blood GasNo lab results found in last 7 days.    ASSESSMENT:   1) Acute L hand abscess s/p I&D in ED. Pt clinically afebrile with normal white count  2) Hyponatremia, mild and likely hypervolemic and due to pt admitting to drinking significant amt of water since admission    PLAN:  Swab nares to r/o MRSA. Add-on CRP to labs already drawn and trend with repeat CRP tomorrow am. Continue with Keflex as ordered for the time being. Repeat BMP tomorrow am and instructed pt to continue with fluid intake but hold off on further water. Medicine will continue to follow.     Duy Hoover PA-C  Internal Medicine Hospitalist   [facility] Hospitalist group  [phone]     "

## 2019-05-20 NOTE — PLAN OF CARE
Problem: Adult Behavioral Health Plan of Care  Goal: Team Discussion  Description  Team Plan:  Outcome: Declining  Note:   BEHAVIORAL TEAM DISCUSSION    Participants: Dr. Erin MD; Vilma Briscoe RN; HEATH Barrera  Progress: Patient continues to progress through opiate withdrawal. Patient has abscess on left hand that has been worsening. Internal medicine is following.   Continued Stay Criteria/Rationale: Patient continues to be in withdrawal and continues to have medical concerns that require monitoring/management.   Medical/Physical:   Per internal medicine follow-up 5/20/2019:  ASSESSMENT:   1) Acute L hand abscess s/p I&D in ED. Pt clinically afebrile with normal white count  2) Hyponatremia, mild and likely hypervolemic and due to pt admitting to drinking significant amt of water since admission     PLAN:  Swab nares to r/o MRSA. Add-on CRP to labs already drawn and trend with repeat CRP tomorrow am. Continue with Keflex as ordered for the time being. Repeat BMP tomorrow am and instructed pt to continue with fluid intake but hold off on further water. Medicine will continue to follow.     Precautions:   Behavioral Orders   Procedures    Code 1 - Restrict to Unit    Routine Programming     As clinically indicated    Status 15     Every 15 minutes.    Withdrawal precautions     Plan: Continue medical monitoring of withdrawal, other medical concerns. Patient working with case management to coordinate admission to [facility] in [place], [place] and return to suboxone provider Dr. March at [facility].   Rationale for change in precautions or plan: No change.

## 2019-05-20 NOTE — PROGRESS NOTES
"Case Management Note  5/20/2019    Worked with pt throughout the day. Pt and writer called his mother, Nona (release on file) ([phone]). She is on board with pt to return to [facility].     Pt and writer contacted his counselor at [facility] (release signed for [facility]). She recommended pt attend IOP treatment in [facility] and stay in their lodging. Patient is on board with this plan. Patient's counselor provided pt with number for \"Marleni\", admissions counselor. When called, VM said \"Sudheer Napoles\". Both writer and pt left messages for Sudheer requesting call back or that message be forwarded to Marleni. If pt does not hear from Marleni/Sudheer, he will call central admissions line for [facility].     Patient also intends to return to Dr. March at [facility] for suboxone. Patient called them, he can get in to see Dr. March ASAP after discharge. He is holding off on scheduling appointment until his discharge date/disposition is better known.     Maira Ren, EITAN, LADC        "

## 2019-05-20 NOTE — PROGRESS NOTES
[facility], [place]   Psychiatric Progress Note        Interim History:   The patient's care was discussed with the treatment team during the daily team meeting and/or staff's chart notes were reviewed.  Staff report patient is doing better. Withdrawal improving. Denied dep, anx and SI. No overt psychosis, malinda or confusion. Slept poorly partly due arm pain. Fever subsided this AM. Compliant with medications and care.     The patient noted that he is feeling a bit better today. Withdrawal improved, but appetite is poor. Hand pain fluctuates but slept poorly last night as a result. Denied dep,SI and KATE. Reported mild anxiety but situational and nervious about treatment. No hallucinations or racing thoughts. Tolerating medications well.     Discussed medications and care plan.          Medications:       buprenorphine  4 mg Sublingual TID     cephALEXin  500 mg Oral Q6H THIERRY          Allergies:   No Known Allergies       Labs:     Recent Results (from the past 24 hour(s))   UA with Microscopic reflex to Culture    Collection Time: 05/19/19 10:50 AM   Result Value Ref Range    Color Urine Yellow     Appearance Urine Clear     Glucose Urine Negative NEG^Negative mg/dL    Bilirubin Urine Negative NEG^Negative    Ketones Urine 5 (A) NEG^Negative mg/dL    Specific Gravity Urine 1.008 1.003 - 1.035    Blood Urine Negative NEG^Negative    pH Urine 5.5 5.0 - 7.0 pH    Protein Albumin Urine Negative NEG^Negative mg/dL    Urobilinogen mg/dL Normal 0.0 - 2.0 mg/dL    Nitrite Urine Negative NEG^Negative    Leukocyte Esterase Urine Negative NEG^Negative    Source Midstream Urine     WBC Urine 14 (H) 0 - 5 /HPF    RBC Urine <1 0 - 2 /HPF    Squamous Epithelial /HPF Urine <1 0 - 1 /HPF    Mucous Urine Present (A) NEG^Negative /LPF   Urine Culture Aerobic Bacterial    Collection Time: 05/19/19 10:50 AM   Result Value Ref Range    Specimen Description Midstream Urine     Special Requests Specimen  received in preservative     Culture Micro Culture negative < 24 hours, reincubate    Basic metabolic panel    Collection Time: 05/20/19  7:50 AM   Result Value Ref Range    Sodium 130 (L) 133 - 144 mmol/L    Potassium 3.6 3.4 - 5.3 mmol/L    Chloride 90 (L) 94 - 109 mmol/L    Carbon Dioxide 32 20 - 32 mmol/L    Anion Gap 8 3 - 14 mmol/L    Glucose 100 (H) 70 - 99 mg/dL    Urea Nitrogen 15 7 - 30 mg/dL    Creatinine 0.89 0.66 - 1.25 mg/dL    GFR Estimate >90 >60 mL/min/[1.73_m2]    GFR Estimate If Black >90 >60 mL/min/[1.73_m2]    Calcium 9.2 8.5 - 10.1 mg/dL          Psychiatric Examination:     Vitals:    05/19/19 1627 05/19/19 2019 05/20/19 0529 05/20/19 0733   BP: 123/82 140/86  124/84   BP Location: Right arm      Pulse: 92 67  92   Resp: 16 16  16   Temp: 99.9  F (37.7  C) 99.8  F (37.7  C) 99.4  F (37.4  C) 97.6  F (36.4  C)   TempSrc: Tympanic Tympanic  Oral   SpO2:    (!) 88%   Weight:       Height:                                                      Weight is 195 lbs 0 oz  Body mass index is 27.2 kg/m .    Appearance: awake, alert, appeared as age stated, well groomed and no apparent distress  Attitude:  cooperative  Eye Contact:  good  Mood:  better  Affect:  appropriate and in normal range, intensity is normal and full range  Speech:  clear, coherent and normal prosody  Psychomotor Behavior:  no evidence of tardive dyskinesia, dystonia, or tics and intact station, gait and muscle tone  Throught Process:  linear and goal oriented  Associations:  no loose associations  Thought Content:  no evidence of suicidal ideation or homicidal ideation and no evidence of psychotic thought  Insight:  fair  Judgement:  intact  Oriented to:  time, person, and place  Attention Span and Concentration:  intact  Recent and Remote Memory:  intact         Precautions:     Behavioral Orders   Procedures     Code 1 - Restrict to Unit     Routine Programming     As clinically indicated     Status 15     Every 15 minutes.      Withdrawal precautions          Diagnoses:     1.  Opioid use disorder -- severe, with active withdrawals.     2.  Cannabis use disorder -- severe.   3.  Rule out substance-induced mood disorder.   4.  Cellulitis at the site of IV injection with possible abscess.          Plan:     Medications:  1.  The patient was placed on opiate withdrawal protocol with Subutex taper starting at 4 mg b.i.d, and later increased to 4 mg TID due to elevated withdrawal symptoms. The patient is interested in transitioning to Suboxone maintenance.  Will likely be issued on discharge at 4 versus 8 mg b.i.d if able to secure an appointment with an outpatient provider.    2.  P.r.n. hydroxyzine for anxiety, clonidine for breakthrough withdrawals, and trazodone for insomnia.       Legal Status and Disposition:  -- The patient was admitted to detox on a voluntary status   -- The patient will need to complete CD assessment. The patient is planing to enroll into [facility].  The patient will be granted discharge when established safety in the community and completed detoxification

## 2019-05-21 LAB
ANION GAP SERPL CALCULATED.3IONS-SCNC: 10 MMOL/L (ref 3–14)
BASOPHILS # BLD AUTO: 0 10E9/L (ref 0–0.2)
BASOPHILS NFR BLD AUTO: 0.2 %
BUN SERPL-MCNC: 17 MG/DL (ref 7–30)
CALCIUM SERPL-MCNC: 9 MG/DL (ref 8.5–10.1)
CHLORIDE SERPL-SCNC: 97 MMOL/L (ref 94–109)
CO2 SERPL-SCNC: 29 MMOL/L (ref 20–32)
CREAT SERPL-MCNC: 0.73 MG/DL (ref 0.66–1.25)
CRP SERPL-MCNC: 121 MG/L (ref 0–8)
DIFFERENTIAL METHOD BLD: NORMAL
EOSINOPHIL # BLD AUTO: 0.3 10E9/L (ref 0–0.7)
EOSINOPHIL NFR BLD AUTO: 4.6 %
GFR SERPL CREATININE-BSD FRML MDRD: >90 ML/MIN/{1.73_M2}
GLUCOSE SERPL-MCNC: 99 MG/DL (ref 70–99)
IMM GRANULOCYTES # BLD: 0.1 10E9/L (ref 0–0.4)
IMM GRANULOCYTES NFR BLD: 1.5 %
LYMPHOCYTES # BLD AUTO: 1.7 10E9/L (ref 0.8–5.3)
LYMPHOCYTES NFR BLD AUTO: 27 %
MONOCYTES # BLD AUTO: 0.2 10E9/L (ref 0–1.3)
MONOCYTES NFR BLD AUTO: 2.9 %
NEUTROPHILS # BLD AUTO: 3.9 10E9/L (ref 1.6–8.3)
NEUTROPHILS NFR BLD AUTO: 63.8 %
NRBC # BLD AUTO: 0 10*3/UL
NRBC BLD AUTO-RTO: 0 /100
POTASSIUM SERPL-SCNC: 4 MMOL/L (ref 3.4–5.3)
SODIUM SERPL-SCNC: 136 MMOL/L (ref 133–144)
WBC # BLD AUTO: 6.1 10E9/L (ref 4–11)

## 2019-05-21 PROCEDURE — 25000132 ZZH RX MED GY IP 250 OP 250 PS 637: Performed by: PHYSICIAN ASSISTANT

## 2019-05-21 PROCEDURE — 99232 SBSQ HOSP IP/OBS MODERATE 35: CPT | Performed by: PSYCHIATRY & NEUROLOGY

## 2019-05-21 PROCEDURE — 85004 AUTOMATED DIFF WBC COUNT: CPT | Performed by: PHYSICIAN ASSISTANT

## 2019-05-21 PROCEDURE — 86140 C-REACTIVE PROTEIN: CPT | Performed by: PHYSICIAN ASSISTANT

## 2019-05-21 PROCEDURE — 12800008 ZZH R&B CD ADULT

## 2019-05-21 PROCEDURE — 80048 BASIC METABOLIC PNL TOTAL CA: CPT | Performed by: PHYSICIAN ASSISTANT

## 2019-05-21 PROCEDURE — 25000132 ZZH RX MED GY IP 250 OP 250 PS 637: Performed by: NURSE PRACTITIONER

## 2019-05-21 PROCEDURE — 25000132 ZZH RX MED GY IP 250 OP 250 PS 637: Performed by: PSYCHIATRY & NEUROLOGY

## 2019-05-21 PROCEDURE — 36416 COLLJ CAPILLARY BLOOD SPEC: CPT | Performed by: PHYSICIAN ASSISTANT

## 2019-05-21 PROCEDURE — H2032 ACTIVITY THERAPY, PER 15 MIN: HCPCS

## 2019-05-21 PROCEDURE — 85048 AUTOMATED LEUKOCYTE COUNT: CPT | Performed by: PHYSICIAN ASSISTANT

## 2019-05-21 RX ADMIN — IBUPROFEN 600 MG: 600 TABLET ORAL at 20:09

## 2019-05-21 RX ADMIN — CEPHALEXIN 500 MG: 500 CAPSULE ORAL at 00:40

## 2019-05-21 RX ADMIN — IBUPROFEN 600 MG: 600 TABLET ORAL at 14:08

## 2019-05-21 RX ADMIN — BUPRENORPHINE HYDROCHLORIDE 4 MG: 2 TABLET SUBLINGUAL at 08:05

## 2019-05-21 RX ADMIN — CLONIDINE HYDROCHLORIDE 0.1 MG: 0.1 TABLET ORAL at 16:25

## 2019-05-21 RX ADMIN — BUPRENORPHINE HYDROCHLORIDE 4 MG: 2 TABLET SUBLINGUAL at 20:09

## 2019-05-21 RX ADMIN — BUPRENORPHINE HYDROCHLORIDE 4 MG: 2 TABLET SUBLINGUAL at 14:08

## 2019-05-21 RX ADMIN — CEPHALEXIN 500 MG: 500 CAPSULE ORAL at 11:33

## 2019-05-21 RX ADMIN — TRAZODONE HYDROCHLORIDE 50 MG: 50 TABLET ORAL at 21:32

## 2019-05-21 RX ADMIN — CEPHALEXIN 500 MG: 500 CAPSULE ORAL at 06:30

## 2019-05-21 RX ADMIN — IBUPROFEN 600 MG: 600 TABLET ORAL at 08:05

## 2019-05-21 RX ADMIN — CEPHALEXIN 500 MG: 500 CAPSULE ORAL at 18:22

## 2019-05-21 ASSESSMENT — ACTIVITIES OF DAILY LIVING (ADL)
ORAL_HYGIENE: INDEPENDENT
HYGIENE/GROOMING: INDEPENDENT
LAUNDRY: WITH SUPERVISION
DRESS: INDEPENDENT

## 2019-05-21 NOTE — PROGRESS NOTES
"  [facility], [place]   Psychiatric Progress Note        Interim History:     The patient's care was discussed with the treatment team during the daily team meeting and/or staff's chart notes were reviewed.  Staff report patient is doing better. Withdrawal resolving. Denied dep, anx and SI. No overt psychosis, malinda or confusion. Slept well. Hand swelling persist but fever resolved.  Compliant with medications and care.     The patient is very concerned about discharging to the community because he fears relapsing as been unable to remain sober. He hopes to discharge directly to CD program. He is tolerating medications well. Withdrawal improving. Appetite is poor but been \"trying to eat better\". Slept better last night. Hand pain persist. Denied dep, anx and SI. No hallucinations or racing thoughts.  Tolerating medications well.     Discussed medications and care plan.          Medications:       buprenorphine  4 mg Sublingual TID     cephALEXin  500 mg Oral Q6H THIERRY          Allergies:   No Known Allergies       Labs:     Recent Results (from the past 24 hour(s))   Methicillin Resistant Staph Aureus PCR    Collection Time: 05/20/19 12:25 PM   Result Value Ref Range    Specimen Description Nares     Methicillin Resist/Sens S. aureus PCR Negative NEG^Negative   CRP inflammation    Collection Time: 05/21/19  7:53 AM   Result Value Ref Range    CRP Inflammation 121.0 (H) 0.0 - 8.0 mg/L   Basic metabolic panel    Collection Time: 05/21/19  7:53 AM   Result Value Ref Range    Sodium 136 133 - 144 mmol/L    Potassium 4.0 3.4 - 5.3 mmol/L    Chloride 97 94 - 109 mmol/L    Carbon Dioxide 29 20 - 32 mmol/L    Anion Gap 10 3 - 14 mmol/L    Glucose 99 70 - 99 mg/dL    Urea Nitrogen 17 7 - 30 mg/dL    Creatinine 0.73 0.66 - 1.25 mg/dL    GFR Estimate >90 >60 mL/min/[1.73_m2]    GFR Estimate If Black >90 >60 mL/min/[1.73_m2]    Calcium 9.0 8.5 - 10.1 mg/dL   WBC and differential    Collection Time: " 05/21/19  7:53 AM   Result Value Ref Range    WBC 6.1 4.0 - 11.0 10e9/L    Diff Method Automated Method     % Neutrophils 63.8 %    % Lymphocytes 27.0 %    % Monocytes 2.9 %    % Eosinophils 4.6 %    % Basophils 0.2 %    % Immature Granulocytes 1.5 %    Nucleated RBCs 0 0 /100    Absolute Neutrophil 3.9 1.6 - 8.3 10e9/L    Absolute Lymphocytes 1.7 0.8 - 5.3 10e9/L    Absolute Monocytes 0.2 0.0 - 1.3 10e9/L    Absolute Eosinophils 0.3 0.0 - 0.7 10e9/L    Absolute Basophils 0.0 0.0 - 0.2 10e9/L    Abs Immature Granulocytes 0.1 0 - 0.4 10e9/L    Absolute Nucleated RBC 0.0           Psychiatric Examination:     Vitals:    05/20/19 1114 05/20/19 1601 05/20/19 2012 05/21/19 0754   BP: (!) 129/91 129/83 139/90 130/88   BP Location:  Left arm     Pulse: 90 68 96 87   Resp: 16 16 16 16   Temp: 99  F (37.2  C) 99.7  F (37.6  C) 99.1  F (37.3  C) 98.8  F (37.1  C)   TempSrc:  Oral Oral Oral   SpO2:    91%   Weight:       Height:                                                      Weight is 195 lbs 0 oz  Body mass index is 27.2 kg/m .    Appearance: awake, alert, appeared as age stated, well groomed and no apparent distress  Attitude:  cooperative  Eye Contact:  good  Mood:  better  Affect:  appropriate and in normal range, intensity is normal and full range  Speech:  clear, coherent and normal prosody  Psychomotor Behavior:  no evidence of tardive dyskinesia, dystonia, or tics and intact station, gait and muscle tone  Throught Process:  linear and goal oriented  Associations:  no loose associations  Thought Content:  no evidence of suicidal ideation or homicidal ideation and no evidence of psychotic thought  Insight:  fair  Judgement:  intact  Oriented to:  time, person, and place  Attention Span and Concentration:  intact  Recent and Remote Memory:  intact         Precautions:     Behavioral Orders   Procedures     Code 1 - Restrict to Unit     Routine Programming     As clinically indicated     Status 15     Every 15  minutes.     Withdrawal precautions          Diagnoses:     1.  Opioid use disorder -- severe, with active withdrawals.     2.  Cannabis use disorder -- severe.   3.  Rule out substance-induced mood disorder.   4.  Cellulitis at the site of IV injection with possible abscess.          Plan:     Medications:  1.  The patient was placed on opiate withdrawal protocol with Subutex taper starting at 4 mg b.i.d, and later increased to 4 mg TID due to elevated withdrawal symptoms. The patient is interested in transitioning to Suboxone maintenance.  Will likely be issued on discharge at 4 versus 8 mg b.i.d if able to secure an appointment with an outpatient provider.    2.  P.r.n. hydroxyzine for anxiety, clonidine for breakthrough withdrawals, and trazodone for insomnia.       Legal Status and Disposition:  -- The patient was admitted to detox on a voluntary status   -- The patient will need to complete CD assessment. The patient is planing to enroll into [facility].  The patient will be granted discharge when established safety in the community and completed detoxification

## 2019-05-21 NOTE — PLAN OF CARE
Pt continues to be monitored for opiate withdrawal. Pt had COWs scores this shift of 6 and 2. Pt has abscess to left hand, received PRN ibuprofen x1 for pain and swelling. Hand is swollen and warm to the touch, appears less swollen then yesterday. Incision on hand intact with no drainage. Pt coordinating with [facility] today for discharge to their IOP program. Pt has a tense, anxious affect. Social in milieu for part of shift and also resting in room for part of shift. Pt denies SI.   Vilma Briscoe RN

## 2019-05-21 NOTE — DISCHARGE INSTRUCTIONS
Behavioral Discharge Planning and Instructions  THANK YOU FOR CHOOSING THE [address]  [phone]    Summary: You were admitted to [facility] on 5/18/19 for detoxification from opiates.  A medical exam was performed that included lab work. You have met with a  and opted to attend outpatient treatment at [facility], return to suboxone maintenance.  Please take care and make your recovery a daily priority, John! It was a pleasure working with you and the entire treatment team here wishes you the very best in your recovery!     Recommendation:  IOP treatment at [facility] or similar; return to suboxone maintenance with Dr. Anca DO    Main Diagnoses:  Per Dr. Erin MD:  1.  Opioid use disorder -- severe, with active withdrawals.     2.  Cannabis use disorder -- severe.   3.  Rule out substance-induced mood disorder.   4.  Cellulitis at the site of IV injection with possible abscess.    Major Treatments, Procedures and Findings:  You were treated for opiate withdrawal detoxification using buprenorphine. As an outpatient you will be prescribed suboxone, please take this medication as prescribed until it is gone. You met with a chemical dependency assessment. You had labs drawn and those results were reviewed with you. Please take a copy of your lab work with you to your next primary care physician appointment.    Symptoms to Report:  If you experience more anxiety, confusion, sleeplessness, deep sadness or thoughts of suicide, notify your treatment team or notify your primary care physician. IF ANY OF THE SYMPTOMS YOU ARE EXPERIENCING ARE A MEDICAL EMERGENCY CALL 911 IMMEDIATELY.     Lifestyle Adjustment: Adjust your lifestyle to get enough sleep, relaxation, exercise and good nutrition. Continue to develop healthy coping skills to decrease stress and promote a sober living environment. Do not use mood altering substances including alcohol, illegal drugs or addictive medications  other than what is currently prescribed.     Disposition: Return home; [facility] IOP and suboxone maintenance    Treatment Program  [facility]  Start date/time: Friday at 2:30pm 5/24/19  Address: [address] [place] [address]  Phone: [phone]    Follow-up Appointment:   Appointment Date/Time:To schedule on discharge from [facility]    Suboxone maintenance  Psychiatrist/Primary Care Giver: Dr. Da March DO      Address: [facility], [address],[place] [address]      Phone Number: [phone]   Fax number: [phone]     Resources:   AA/NA and Sponsors are excellent resources for support and you can find one at any support group meeting.   http://www.aastpaul.org (then click meetings) This for the [facility] AA meetings  http://aamin[place].org/meetings/   This is for the [facility] AA meetings  http://www.nam[place]. (then click find a  Meeting) This is for [facility] NA   SMART Recovery - self management for addiction recovery:  www.EventVuerecovery.org  Pathways ~ A Health Crisis Resource & Support Center:  853.246.9912.  https://prescribetoprevent.org/patient-education/videos/  http://www.harmreduction.org  [facility] [phone]  Support Group:  AA/NA and Sponsor/support.  National [place] on Mental Illness (www.[place].c[place]opher.org): 750.912.6093 or 087-547-8248.  Alcoholics Anonymous (www.alcoholics-anonymous.org): Check your phone book for your local chapter.  Suicide Awareness Voices of Education (SAVE) (www.save.org): 194-481-TQGB (5983)  National Suicide Prevention Line (www.mentalhealthmn.org): 998-609-OKTR (4233)  Mental Health Consumer/Survivor Network of [place] (www.mhcsn.net): 620.812.6252 or 159-710-2009  Mental Health Association of [place] (www.mentalhealth.org): 836.778.6593 or 248-232-9638   Substance Abuse and Mental Health Services (www.samhsa.gov)    [place] Recovery Connection (MRC)  MRC connects people seeking recovery to  resources that help foster and sustain long-term recovery.  Whether you are seeking resources for treatment, transportation, housing, job training, education, health care or other pathways to recovery, [facility] is a great place to start.  860.415.3462.  www.[facility].org    General Medication Instructions:   See your medication sheet(s) for instructions.   Take all medications as prescribed.  Make no changes unless your doctor suggests them.   Go to all your doctor visits.  Be sure to have all your required lab tests. This way, your medicines can be refilled on time.  Do not use any forms of alcohol.    Please Note:  If you have any questions at anytime after you are discharged please call the [facility] [place] detox unit 3AW unit at [phone].  [facility], Behavioral Intake [phone]  Medical Records call [phone]  Outpatient Behavioral Intake call [phone]  LP+ Wait List/Bed Availability call [phone]    Please take this discharge folder with you to all your follow up appointments, it contains your lab results, diagnosis, medication list and discharge recommendations.      THANK YOU FOR CHOOSING THE [facility]

## 2019-05-21 NOTE — PROGRESS NOTES
05/20/19 1645   Group Therapy Session   Group Attendance attended group session   Total Time (minutes) 45   Group Type psychotherapeutic   Group Topic Covered   (self-awareness)   Patient Participation/Contribution cooperative with task;discussed personal experience with topic   Psychotherapy group goals: Identify and share responses to 4 questions (fears, loves/likes, things to let go, wants).  John, actively participated in group and openly shared responses during group discussion.

## 2019-05-22 PROCEDURE — 25000132 ZZH RX MED GY IP 250 OP 250 PS 637: Performed by: PHYSICIAN ASSISTANT

## 2019-05-22 PROCEDURE — 99232 SBSQ HOSP IP/OBS MODERATE 35: CPT | Performed by: PSYCHIATRY & NEUROLOGY

## 2019-05-22 PROCEDURE — 12800008 ZZH R&B CD ADULT

## 2019-05-22 PROCEDURE — 25000132 ZZH RX MED GY IP 250 OP 250 PS 637: Performed by: PSYCHIATRY & NEUROLOGY

## 2019-05-22 PROCEDURE — 25000132 ZZH RX MED GY IP 250 OP 250 PS 637: Performed by: NURSE PRACTITIONER

## 2019-05-22 RX ADMIN — HYDROXYZINE HYDROCHLORIDE 25 MG: 25 TABLET ORAL at 11:37

## 2019-05-22 RX ADMIN — CEPHALEXIN 500 MG: 500 CAPSULE ORAL at 18:18

## 2019-05-22 RX ADMIN — BUPRENORPHINE HYDROCHLORIDE 4 MG: 2 TABLET SUBLINGUAL at 14:01

## 2019-05-22 RX ADMIN — IBUPROFEN 600 MG: 600 TABLET ORAL at 08:37

## 2019-05-22 RX ADMIN — BUPRENORPHINE HYDROCHLORIDE 4 MG: 2 TABLET SUBLINGUAL at 08:16

## 2019-05-22 RX ADMIN — ACETAMINOPHEN 650 MG: 325 TABLET, FILM COATED ORAL at 18:18

## 2019-05-22 RX ADMIN — CEPHALEXIN 500 MG: 500 CAPSULE ORAL at 00:14

## 2019-05-22 RX ADMIN — TRAZODONE HYDROCHLORIDE 50 MG: 50 TABLET ORAL at 21:16

## 2019-05-22 RX ADMIN — HYDROXYZINE HYDROCHLORIDE 25 MG: 25 TABLET ORAL at 20:19

## 2019-05-22 RX ADMIN — BUPRENORPHINE HYDROCHLORIDE 4 MG: 2 TABLET SUBLINGUAL at 20:19

## 2019-05-22 RX ADMIN — ACETAMINOPHEN 650 MG: 325 TABLET, FILM COATED ORAL at 14:01

## 2019-05-22 RX ADMIN — IBUPROFEN 600 MG: 600 TABLET ORAL at 16:11

## 2019-05-22 RX ADMIN — CEPHALEXIN 500 MG: 500 CAPSULE ORAL at 11:37

## 2019-05-22 RX ADMIN — CEPHALEXIN 500 MG: 500 CAPSULE ORAL at 05:56

## 2019-05-22 ASSESSMENT — ACTIVITIES OF DAILY LIVING (ADL)
HYGIENE/GROOMING: INDEPENDENT
LAUNDRY: UNABLE TO COMPLETE
ORAL_HYGIENE: INDEPENDENT
DRESS: INDEPENDENT

## 2019-05-22 NOTE — PROGRESS NOTES
Case Management Note  5/22/2019    Writer met with pt to confirm his discharge plans. Pt reports he is scheduled to admit to [facility]'s IOP Treatment Program with lodging on Friday, 5/24/19. Pt has BCBS Out of State insurance. Pt signed a ELLIS for BCBS for appeals through the [place] UR Office. Writer emailed signed ELLIS to the UR office. Pt reports he will need to arrange transportation to [facility] on Friday, 5/24/19. Writer provided pt with a [employer] phone number to arrange transportation. Pt reports because his insurance is out of state, [employer] will not pay for this.     Benoit Antonio MA, LADC

## 2019-05-22 NOTE — PROGRESS NOTES
[facility], [place]   Psychiatric Progress Note        Interim History:     The patient's care was discussed with the treatment team during the daily team meeting and/or staff's chart notes were reviewed.  Staff report patient is doing better. Withdrawal resolving. Wrist cellulitis improving and had no fever. Denied dep, anx and SI. No overt psychosis, malinda or confusion. Sleeping well. Out for meals. Social and engage in programing.  Compliant with medications and care.     The patient noted that he is feeling better. Rest swelling and pain improved. Worried about relapsed if discharge to the community and prefers to discharge directly to CD treatment.  He is tolerating medications well. Withdrawal resolving and appetite improved. Slept well.  Denied dep, anx and SI. No hallucinations or racing thoughts.  Tolerating medications well.     Discussed medications and care plan.          Medications:       buprenorphine  4 mg Sublingual TID     cephALEXin  500 mg Oral Q6H THIERRY          Allergies:   No Known Allergies       Labs:     No results found for this or any previous visit (from the past 24 hour(s)).       Psychiatric Examination:     Vitals:    05/21/19 1615 05/21/19 1959 05/22/19 0810 05/22/19 1119   BP: 134/84 122/79 125/85 132/86   BP Location:  Left arm  Left arm   Pulse: 85 71 63 70   Resp: 16 16 16 16   Temp: 98.2  F (36.8  C) 98.8  F (37.1  C) 97.8  F (36.6  C) 98.6  F (37  C)   TempSrc: Oral Oral Oral Tympanic   SpO2:   97%    Weight:       Height:                                                      Weight is 195 lbs 0 oz  Body mass index is 27.2 kg/m .    Appearance: awake, alert, appeared as age stated, well groomed and no apparent distress  Attitude:  cooperative pleasant and engaged.   Eye Contact:  good  Mood:  better  Affect:  appropriate and in normal range, intensity is normal and full range  Speech:  clear, coherent and normal prosody  Psychomotor Behavior:  no  evidence of tardive dyskinesia, dystonia, or tics and intact station, gait and muscle tone  Throught Process:  linear and goal oriented  Associations:  no loose associations  Thought Content:  no evidence of suicidal ideation or homicidal ideation and no evidence of psychotic thought  Insight:  fair  Judgement:  intact  Oriented to:  time, person, and place  Attention Span and Concentration:  intact  Recent and Remote Memory:  intact         Precautions:     Behavioral Orders   Procedures     Code 1 - Restrict to Unit     Routine Programming     As clinically indicated     Status 15     Every 15 minutes.     Withdrawal precautions          Diagnoses:     1.  Opioid use disorder -- severe, with active withdrawals.     2.  Cannabis use disorder -- severe.   3.  Rule out substance-induced mood disorder.   4.  Cellulitis at the site of IV injection with possible abscess.          Plan:     Medications:  1.  The patient was placed on opiate withdrawal protocol with Subutex taper starting at 4 mg b.i.d, and later increased to 4 mg TID due to elevated withdrawal symptoms. The patient is interested in transitioning to Suboxone maintenance. He plans to seek Suboxone maintenance at  treatment.   2.  P.r.n. hydroxyzine for anxiety, clonidine for breakthrough withdrawals, and trazodone for insomnia.       Legal Status and Disposition:  -- The patient was admitted to detox on a voluntary status   -- The patient completed CD assessment. The patient is planing to enroll into [facility].  The patient will be granted discharge when established safety in the community and completed detoxification. Plan discharge to CD program on Friday.

## 2019-05-22 NOTE — PROGRESS NOTES
05/21/19 2200   Therapeutic Recreation   Type of Intervention structured groups   Activity game   Response Participates, initiates socially appropriate   Hours 1     Pt participated in Therapeutic Recreation group with focus on stress reduction, creative expression, and leisure participation. Engaged and cooperative in group recreational group via a group drawing game. Pt participated throughout entire duration of the group. Showed progress in session goals. Pt mood was calm and appropriate.

## 2019-05-23 LAB
C TRACH DNA SPEC QL NAA+PROBE: NEGATIVE
CRP SERPL-MCNC: 51.3 MG/L (ref 0–8)
N GONORRHOEA DNA SPEC QL NAA+PROBE: NEGATIVE
SPECIMEN SOURCE: NORMAL
SPECIMEN SOURCE: NORMAL

## 2019-05-23 PROCEDURE — H2035 A/D TX PROGRAM, PER HOUR: HCPCS

## 2019-05-23 PROCEDURE — 99238 HOSP IP/OBS DSCHRG MGMT 30/<: CPT | Performed by: PSYCHIATRY & NEUROLOGY

## 2019-05-23 PROCEDURE — H2032 ACTIVITY THERAPY, PER 15 MIN: HCPCS

## 2019-05-23 PROCEDURE — 25000132 ZZH RX MED GY IP 250 OP 250 PS 637: Performed by: PSYCHIATRY & NEUROLOGY

## 2019-05-23 PROCEDURE — 86140 C-REACTIVE PROTEIN: CPT | Performed by: PHYSICIAN ASSISTANT

## 2019-05-23 PROCEDURE — 12800008 ZZH R&B CD ADULT

## 2019-05-23 PROCEDURE — 25000132 ZZH RX MED GY IP 250 OP 250 PS 637: Performed by: NURSE PRACTITIONER

## 2019-05-23 PROCEDURE — 36415 COLL VENOUS BLD VENIPUNCTURE: CPT | Performed by: PHYSICIAN ASSISTANT

## 2019-05-23 PROCEDURE — 25000132 ZZH RX MED GY IP 250 OP 250 PS 637: Performed by: PHYSICIAN ASSISTANT

## 2019-05-23 RX ORDER — TRAZODONE HYDROCHLORIDE 50 MG/1
50 TABLET, FILM COATED ORAL
Qty: 30 TABLET | Refills: 0 | Status: SHIPPED | OUTPATIENT
Start: 2019-05-23 | End: 2019-06-22

## 2019-05-23 RX ORDER — HYDROXYZINE HYDROCHLORIDE 25 MG/1
25 TABLET, FILM COATED ORAL EVERY 4 HOURS PRN
Qty: 15 TABLET | Refills: 0 | Status: SHIPPED | OUTPATIENT
Start: 2019-05-23 | End: 2019-06-22

## 2019-05-23 RX ADMIN — CEPHALEXIN 500 MG: 500 CAPSULE ORAL at 05:52

## 2019-05-23 RX ADMIN — BUPRENORPHINE HYDROCHLORIDE 4 MG: 2 TABLET SUBLINGUAL at 20:17

## 2019-05-23 RX ADMIN — HYDROXYZINE HYDROCHLORIDE 25 MG: 25 TABLET ORAL at 16:14

## 2019-05-23 RX ADMIN — BUPRENORPHINE HYDROCHLORIDE 4 MG: 2 TABLET SUBLINGUAL at 08:03

## 2019-05-23 RX ADMIN — CEPHALEXIN 500 MG: 500 CAPSULE ORAL at 12:01

## 2019-05-23 RX ADMIN — TRAZODONE HYDROCHLORIDE 50 MG: 50 TABLET ORAL at 22:01

## 2019-05-23 RX ADMIN — HYDROXYZINE HYDROCHLORIDE 25 MG: 25 TABLET ORAL at 12:46

## 2019-05-23 RX ADMIN — CEPHALEXIN 500 MG: 500 CAPSULE ORAL at 00:24

## 2019-05-23 RX ADMIN — CEPHALEXIN 500 MG: 500 CAPSULE ORAL at 17:54

## 2019-05-23 RX ADMIN — IBUPROFEN 600 MG: 600 TABLET ORAL at 20:17

## 2019-05-23 RX ADMIN — BUPRENORPHINE HYDROCHLORIDE 4 MG: 2 TABLET SUBLINGUAL at 14:06

## 2019-05-23 RX ADMIN — IBUPROFEN 600 MG: 600 TABLET ORAL at 14:06

## 2019-05-23 RX ADMIN — IBUPROFEN 600 MG: 600 TABLET ORAL at 08:03

## 2019-05-23 ASSESSMENT — ACTIVITIES OF DAILY LIVING (ADL)
ORAL_HYGIENE: INDEPENDENT
DRESS: INDEPENDENT
HYGIENE/GROOMING: INDEPENDENT
LAUNDRY: WITH SUPERVISION

## 2019-05-23 NOTE — DISCHARGE SUMMARY
"Psychiatric Discharge Summary    John Ojeda MRN# [id number]   Age: 23 year old YOB: 1995     Date of Admission:  5/17/2019  Date of Discharge:  Tomorrow 5/24/2019  Admitting Physician:  Boo Khan MD  Discharge Physician:  Boo Khan MD         Event Leading to Hospitalization:     John Ojeda is a 23-year-old  male with history of chemical dependency who presented to the Emergency Department seeking admission for heroin detoxification and is interested in referral to CD treatment.  The patient tells me that he lives with his girlfriend.  He works as a  at a restaurant.  He is currently in diversion program but does not have a .  He has been in treatment 4-5 times.  He completed all of them except for the first program.  More recently he was at [facility] about 1-1/2 years ago.  He received Suboxone maintenance through Dr. March's clinic at [facility].      The patient smokes about a pack of tobacco per day.  He was sober of heroin and marijuana for 16 months.  No history of cocaine or methamphetamine use.  He has not been taking his Suboxone consistently.  He tells me at times he skips it so he could get high on heroin.  He started actually abusing narcotics when he was 17 years old and transitioned to heroin by the age of 19.  He has been using via IV route, but does not share needles.  He actually has an abscess on his left arm.  No accidental overdoses reported.  He has been using up to a gram of heroin almost daily.  He is interested in Suboxone maintenance.  He is contemplating between referral for outpatient versus residential CD treatment.      The patient saw a psychiatrist when he was attending CD treatment at [facility].  He does not have a therapist.  He has had remote history of alcohol bingeing but none recently.  No withdrawals and does not believe alcohol is a problem.  He has been diagnosed with \"drug-induced depression\", been on some " antidepressants in the past, did not find them helpful and believes he needs them.  No prior suicidal attempts or self-harming behavior.  No prior inpatient hospitalization for mental illness.      In terms of mood symptoms and despite ongoing active substance use, the patient denied feeling depressed.  He reported some anxiety but mainly in the context of inability to maintain sobriety.  His sleep and appetite have been poor.  Energy, motivation, concentration and focus fluctuate and correlates greatly to heroin use.  He is hopeful.  Denied thought of suicide and urges to self-harm.  He reported no symptoms or history related to malinda, psychosis, PTSD, OCD or eating disorder.      The patient denies history of head trauma, concussion and seizures.  He underwent 2 wisdom teeth extractions.  He has an abscess on his left upper extremity at the site of IV drug use.  He has no medical allergies.      The patient tells me that his biological father has bipolar disorder, depression, anxiety and chemical dependency.  The patient grew up in [place], [place], was raised by his mother who remarried when he was in high school.  He grew up with 5 siblings.  He graduated high school on time, attended 3 years of college, majoring in business.  No childhood abuse or neglect.  He is not .  He has no kids.  He currently lives with his girlfriend and works as a .  He is currently in a diversion program, does not have a .       See Admission note by Boo Khan MD on 5/18/2019 for additional details.          Diagnoses:     1.  Opioid use disorder -- severe, with active withdrawals.     2.  Cannabis use disorder -- severe.   3.  Rule out substance-induced mood disorder.   4.  Cellulitis at the site of IV injection with possible abscess.          Labs:     Recent Results (from the past 168 hour(s))   CBC with platelets differential    Collection Time: 05/17/19  6:17 PM   Result Value Ref Range     WBC 13.2 (H) 4.0 - 11.0 10e9/L    RBC Count 4.90 4.4 - 5.9 10e12/L    Hemoglobin 14.8 13.3 - 17.7 g/dL    Hematocrit 42.9 40.0 - 53.0 %    MCV 88 78 - 100 fl    MCH 30.2 26.5 - 33.0 pg    MCHC 34.5 31.5 - 36.5 g/dL    RDW 12.6 10.0 - 15.0 %    Platelet Count 416 150 - 450 10e9/L    Diff Method Automated Method     % Neutrophils 90.2 %    % Lymphocytes 7.6 %    % Monocytes 1.8 %    % Eosinophils 0.0 %    % Basophils 0.1 %    % Immature Granulocytes 0.3 %    Nucleated RBCs 0 0 /100    Absolute Neutrophil 11.9 (H) 1.6 - 8.3 10e9/L    Absolute Lymphocytes 1.0 0.8 - 5.3 10e9/L    Absolute Monocytes 0.2 0.0 - 1.3 10e9/L    Absolute Eosinophils 0.0 0.0 - 0.7 10e9/L    Absolute Basophils 0.0 0.0 - 0.2 10e9/L    Abs Immature Granulocytes 0.0 0 - 0.4 10e9/L    Absolute Nucleated RBC 0.0    Comprehensive metabolic panel    Collection Time: 05/17/19  6:17 PM   Result Value Ref Range    Sodium 127 (L) 133 - 144 mmol/L    Potassium 3.2 (L) 3.4 - 5.3 mmol/L    Chloride 81 (L) 94 - 109 mmol/L    Carbon Dioxide 36 (H) 20 - 32 mmol/L    Anion Gap 10 3 - 14 mmol/L    Glucose 115 (H) 70 - 99 mg/dL    Urea Nitrogen 29 7 - 30 mg/dL    Creatinine 1.13 0.66 - 1.25 mg/dL    GFR Estimate >90 >60 mL/min/[1.73_m2]    GFR Estimate If Black >90 >60 mL/min/[1.73_m2]    Calcium 10.1 8.5 - 10.1 mg/dL    Bilirubin Total 1.0 0.2 - 1.3 mg/dL    Albumin 3.3 (L) 3.4 - 5.0 g/dL    Protein Total 10.0 (H) 6.8 - 8.8 g/dL    Alkaline Phosphatase 102 40 - 150 U/L    ALT 54 0 - 70 U/L    AST 81 (H) 0 - 45 U/L   Drug abuse screen 6 urine (tox)    Collection Time: 05/17/19  8:43 PM   Result Value Ref Range    Amphetamine Qual Urine Negative NEG^Negative    Barbiturates Qual Urine Negative NEG^Negative    Benzodiazepine Qual Urine Negative NEG^Negative    Cannabinoids Qual Urine Positive (A) NEG^Negative    Cocaine Qual Urine Negative NEG^Negative    Ethanol Qual Urine Negative NEG^Negative    Opiates Qualitative Urine Positive (A) NEG^Negative   UA with  Microscopic    Collection Time: 05/17/19  8:43 PM   Result Value Ref Range    Color Urine Yellow     Appearance Urine Slightly Cloudy     Glucose Urine Negative NEG^Negative mg/dL    Bilirubin Urine Negative NEG^Negative    Ketones Urine 5 (A) NEG^Negative mg/dL    Specific Gravity Urine 1.025 1.003 - 1.035    Blood Urine Trace (A) NEG^Negative    pH Urine 5.5 5.0 - 7.0 pH    Protein Albumin Urine 100 (A) NEG^Negative mg/dL    Urobilinogen mg/dL 2.0 0.0 - 2.0 mg/dL    Nitrite Urine Negative NEG^Negative    Leukocyte Esterase Urine Negative NEG^Negative    Source Midstream Urine     WBC Urine 28 (H) 0 - 5 /HPF    RBC Urine 6 (H) 0 - 2 /HPF    Squamous Epithelial /HPF Urine 1 0 - 1 /HPF    Mucous Urine Present (A) NEG^Negative /LPF    Hyaline Casts 30 (H) 0 - 2 /LPF    Wbc Cast 25 (A) NEG^Negative /LPF    Granular Casts 5 (A) NEG^Negative /LPF   CBC with platelets differential    Collection Time: 05/19/19  8:40 AM   Result Value Ref Range    WBC 7.5 4.0 - 11.0 10e9/L    RBC Count 4.58 4.4 - 5.9 10e12/L    Hemoglobin 13.7 13.3 - 17.7 g/dL    Hematocrit 39.9 (L) 40.0 - 53.0 %    MCV 87 78 - 100 fl    MCH 29.9 26.5 - 33.0 pg    MCHC 34.3 31.5 - 36.5 g/dL    RDW 12.5 10.0 - 15.0 %    Platelet Count 378 150 - 450 10e9/L    Diff Method Automated Method     % Neutrophils 81.7 %    % Lymphocytes 15.4 %    % Monocytes 2.0 %    % Eosinophils 0.5 %    % Basophils 0.1 %    % Immature Granulocytes 0.3 %    Nucleated RBCs 0 0 /100    Absolute Neutrophil 6.1 1.6 - 8.3 10e9/L    Absolute Lymphocytes 1.2 0.8 - 5.3 10e9/L    Absolute Monocytes 0.2 0.0 - 1.3 10e9/L    Absolute Eosinophils 0.0 0.0 - 0.7 10e9/L    Absolute Basophils 0.0 0.0 - 0.2 10e9/L    Abs Immature Granulocytes 0.0 0 - 0.4 10e9/L    Absolute Nucleated RBC 0.0    Comprehensive metabolic panel    Collection Time: 05/19/19  8:40 AM   Result Value Ref Range    Sodium 130 (L) 133 - 144 mmol/L    Potassium 3.3 (L) 3.4 - 5.3 mmol/L    Chloride 84 (L) 94 - 109 mmol/L     Carbon Dioxide 37 (H) 20 - 32 mmol/L    Anion Gap 9 3 - 14 mmol/L    Glucose 106 (H) 70 - 99 mg/dL    Urea Nitrogen 20 7 - 30 mg/dL    Creatinine 0.91 0.66 - 1.25 mg/dL    GFR Estimate >90 >60 mL/min/[1.73_m2]    GFR Estimate If Black >90 >60 mL/min/[1.73_m2]    Calcium 9.3 8.5 - 10.1 mg/dL    Bilirubin Total 0.6 0.2 - 1.3 mg/dL    Albumin 2.7 (L) 3.4 - 5.0 g/dL    Protein Total 8.5 6.8 - 8.8 g/dL    Alkaline Phosphatase 78 40 - 150 U/L    ALT 45 0 - 70 U/L    AST 58 (H) 0 - 45 U/L   Lipid panel    Collection Time: 05/19/19  8:40 AM   Result Value Ref Range    Cholesterol 125 <200 mg/dL    Triglycerides 120 <150 mg/dL    HDL Cholesterol 21 (L) >39 mg/dL    LDL Cholesterol Calculated 80 <100 mg/dL    Non HDL Cholesterol 104 <130 mg/dL   TSH with free T4 reflex and/or T3 as indicated    Collection Time: 05/19/19  8:40 AM   Result Value Ref Range    TSH 0.64 0.40 - 4.00 mU/L   GGT    Collection Time: 05/19/19  8:40 AM   Result Value Ref Range    GGT 63 0 - 75 U/L   HIV Antigen Antibody Combo    Collection Time: 05/19/19  8:40 AM   Result Value Ref Range    HIV Antigen Antibody Combo Nonreactive NR^Nonreactive       Hepatitis C antibody    Collection Time: 05/19/19  8:40 AM   Result Value Ref Range    Hepatitis C Antibody Nonreactive NR^Nonreactive   UA with Microscopic reflex to Culture    Collection Time: 05/19/19 10:50 AM   Result Value Ref Range    Color Urine Yellow     Appearance Urine Clear     Glucose Urine Negative NEG^Negative mg/dL    Bilirubin Urine Negative NEG^Negative    Ketones Urine 5 (A) NEG^Negative mg/dL    Specific Gravity Urine 1.008 1.003 - 1.035    Blood Urine Negative NEG^Negative    pH Urine 5.5 5.0 - 7.0 pH    Protein Albumin Urine Negative NEG^Negative mg/dL    Urobilinogen mg/dL Normal 0.0 - 2.0 mg/dL    Nitrite Urine Negative NEG^Negative    Leukocyte Esterase Urine Negative NEG^Negative    Source Midstream Urine     WBC Urine 14 (H) 0 - 5 /HPF    RBC Urine <1 0 - 2 /HPF    Squamous  Epithelial /HPF Urine <1 0 - 1 /HPF    Mucous Urine Present (A) NEG^Negative /LPF   Urine Culture Aerobic Bacterial    Collection Time: 05/19/19 10:50 AM   Result Value Ref Range    Specimen Description Midstream Urine     Special Requests Specimen received in preservative     Culture Micro No growth    Basic metabolic panel    Collection Time: 05/20/19  7:50 AM   Result Value Ref Range    Sodium 130 (L) 133 - 144 mmol/L    Potassium 3.6 3.4 - 5.3 mmol/L    Chloride 90 (L) 94 - 109 mmol/L    Carbon Dioxide 32 20 - 32 mmol/L    Anion Gap 8 3 - 14 mmol/L    Glucose 100 (H) 70 - 99 mg/dL    Urea Nitrogen 15 7 - 30 mg/dL    Creatinine 0.89 0.66 - 1.25 mg/dL    GFR Estimate >90 >60 mL/min/[1.73_m2]    GFR Estimate If Black >90 >60 mL/min/[1.73_m2]    Calcium 9.2 8.5 - 10.1 mg/dL   CRP inflammation    Collection Time: 05/20/19  7:50 AM   Result Value Ref Range    CRP Inflammation 134.0 (H) 0.0 - 8.0 mg/L   Methicillin Resistant Staph Aureus PCR    Collection Time: 05/20/19 12:25 PM   Result Value Ref Range    Specimen Description Nares     Methicillin Resist/Sens S. aureus PCR Negative NEG^Negative   CRP inflammation    Collection Time: 05/21/19  7:53 AM   Result Value Ref Range    CRP Inflammation 121.0 (H) 0.0 - 8.0 mg/L   Basic metabolic panel    Collection Time: 05/21/19  7:53 AM   Result Value Ref Range    Sodium 136 133 - 144 mmol/L    Potassium 4.0 3.4 - 5.3 mmol/L    Chloride 97 94 - 109 mmol/L    Carbon Dioxide 29 20 - 32 mmol/L    Anion Gap 10 3 - 14 mmol/L    Glucose 99 70 - 99 mg/dL    Urea Nitrogen 17 7 - 30 mg/dL    Creatinine 0.73 0.66 - 1.25 mg/dL    GFR Estimate >90 >60 mL/min/[1.73_m2]    GFR Estimate If Black >90 >60 mL/min/[1.73_m2]    Calcium 9.0 8.5 - 10.1 mg/dL   WBC and differential    Collection Time: 05/21/19  7:53 AM   Result Value Ref Range    WBC 6.1 4.0 - 11.0 10e9/L    Diff Method Automated Method     % Neutrophils 63.8 %    % Lymphocytes 27.0 %    % Monocytes 2.9 %    % Eosinophils 4.6 %     % Basophils 0.2 %    % Immature Granulocytes 1.5 %    Nucleated RBCs 0 0 /100    Absolute Neutrophil 3.9 1.6 - 8.3 10e9/L    Absolute Lymphocytes 1.7 0.8 - 5.3 10e9/L    Absolute Monocytes 0.2 0.0 - 1.3 10e9/L    Absolute Eosinophils 0.3 0.0 - 0.7 10e9/L    Absolute Basophils 0.0 0.0 - 0.2 10e9/L    Abs Immature Granulocytes 0.1 0 - 0.4 10e9/L    Absolute Nucleated RBC 0.0    CRP inflammation    Collection Time: 05/23/19  7:27 AM   Result Value Ref Range    CRP Inflammation 51.3 (H) 0.0 - 8.0 mg/L          Consults:   Assessment & Plan     John Ojeda is a 23 year old male with a history of opiate abuse who is admitted to  for opiate detoxification.     #Opiate Abuse with Severe Opiate Withdrawal - Using at least 1 g IV heroin daily PTA. Significant withdrawal symptoms including nausea with frequent emesis and minimal PO intake resulting in electrolyte abnormalities as discussed below. Denies any hx of HIV, Hepatitis B/C, or endocarditis.  - HIV and Hepatitis C testing ordered by Psychiatry. Please notify our team if assistance required with results.  - Start IM/PO Compazine PRN for n/v as Zofran only partially effective. Consider addition of Reglan if persistent symptoms.  - Remainder of cares per Psychiatry.     #Left Hand Cellulitis with Abscess s/p I&D (5/17/19) - Febrile to 101.2 with WBC 13.2 on 5/17. POC US in ED suggestive of cellulitis and abscess s/p bedside I&D. Received one dose IV Ancef 5/17, then transitioned to 7 day course of Keflex per ED provider. Currently afebrile w/o leukocytosis. Ongoing significant soft tissue swelling without focal induration on exam; I&D site with minimal surrounding erythema and scant purulent drainage.  - Continue Keflex 500 mg QID x 7 days  - Notify Medicine if febrile >100.4 and/or increased erythema, drainage, pain or swelling  - Medicine will re-evaluate L hand in 1-2 days to ensure improvement.      #Hyponatremia - Sodium 127 on 5/17 --> 130 on 5/19. Likely  "hypovolemic hyponatremia in setting of poor PO intake and GI losses (n/v).  - Give 1L IVF bolus and encourage PO fluids  - Trend BMP in AM.     #Metabolic Alkalosis - Bicarb 36-37 since admission. Likely due to nausea with vomiting +/- contraction alkalosis.   - Antiemetics and IVF as above.  - Trend BMP in AM.     #Abnormal UA - UA (5/17) with 5 ketones, 100 protein, trace blood, 28 WBC, 6 RBC, mucous present, 30 Hyaline casts, 5 granular casts, 25 WBC casts. Reports \"small amount\" of burning with urination.  - Unable to add on Urine Cx to previously obtained sample.  - Repeat UA/UC     #Hypokalemia - Likely due to poor PO intake and GI losses (n/v). Most recent K 3.3 on 5/19.  - Give 60 mEq KCl x 1  - Trend BMP in AM     #Mildly Elevated AST - AST 81 on admission --> 58 on recheck 5/19. Remainder of LFTs wnl. Unclear etiology.  - Repeat CMP per PCP within 1 month of discharge.     Medicine will follow up on repeat BMP, UA/UC, and left hand cellulitis/abscess.     Smita Ospina PA-C       Hospital Course:   John Ojeda was admitted to Detox Unit with attending Boo Khan MD as a voluntary patient. The patient was placed under status 15 (15 minute checks) to ensure patient safety. The patient completed CD assessment and requested referral to [facility]. The patient was fearful of relapse and requested door-door referral and discharge directly to [facility]. He was started on a course of antibiotics for cellulitis.   Patient  did not require seclusion or administration of emergency medications to manage behavior.    The following medication changes took place:   1.  The patient was placed on opiate withdrawal protocol with Subutex taper starting at 4 mg b.i.d, and later increased to 4 mg TID due to elevated withdrawal symptoms. The patient is interested in transitioning to Suboxone maintenance. He plans to seek Suboxone maintenance at  treatment.   2.  P.r.n. hydroxyzine for anxiety, clonidine for breakthrough " withdrawals, and trazodone for insomnia.     The patient tolerated medications well. Reported mood symptoms resolved. The patient was active on the unit. The patient was social, engaged and attended groups. No overt malinda, confusion or psychosis noted. The patient maintained denial of SI, HI and KATE. The patient slept well. Appetite was intact. The patient was compliant with medications and care.     John Ojeda will be released to [facility] tomorrow.  At the time of this encounter, John Ojeda was determined to not be a danger to himself or others and symptoms did not meet criteria for involuntary hospitalization.  The patient denied depression, anxiety, racing thoughts and irritability. The patient denied hallucinations and paranoia. The patient was future oriented and denied SI, KATE and HI. The patient noted tolerating medications well.    Steps taken to minimize risk include: assessing patient s behavior and thought process daily during hospital stay, discharging patient with adequate plan for follow up for mental and physical health, and discussing safety plan of returning to the hospital or calling 911, should the patient ever has thoughts of harming self or others. Therefore, based on all available evidence including the factors cited above, the patient does not appear to be at imminent risk for self-harm, and is appropriate for outpatient level of care.  The patient agreed to continue medications and outpatient care.          Discharge Medications:     Current Discharge Medication List      START taking these medications    Details   hydrOXYzine (ATARAX) 25 MG tablet Take 1 tablet (25 mg) by mouth every 4 hours as needed for anxiety  Qty: 15 tablet, Refills: 0    Associated Diagnoses: Depression with anxiety      traZODone (DESYREL) 50 MG tablet Take 1 tablet (50 mg) by mouth nightly as needed for sleep  Qty: 30 tablet, Refills: 0    Associated Diagnoses: Depression with anxiety         STOP taking  these medications       buprenorphine-naloxone (ZUBSOLV) 5.7-1.4 MG sublingual tablet Comments:   Reason for Stopping:                  Psychiatric and Physical Examinations:   Appearance:  awake, alert, appeared as age stated, well groomed and no apparent distress  Attitude:  cooperative  Eye Contact:  good  Mood:  better and good  Affect:  appropriate and in normal range, mood congruent, full range and reactive  Speech:  clear, coherent and normal prosody  Psychomotor Behavior:  no evidence of tardive dyskinesia, dystonia, or tics and intact station, gait and muscle tone  Thought Process:  linear and goal oriented  Associations:  no loose associations  Thought Content:  no evidence of suicidal ideation or homicidal ideation and no evidence of psychotic thought  Insight:  fair  Judgment:  intact  Oriented to:  time, person, and place  Attention Span and Concentration:  intact  Recent and Remote Memory:  intact  Language and Fund of Knowledge: appropriate  Muscle Strength and Tone: normal  Gait and Station: Normal  Vitals:    05/22/19 1119 05/22/19 1600 05/22/19 2006 05/23/19 0700   BP: 132/86 128/74 127/90 128/86   BP Location: Left arm Right arm  Left arm   Pulse: 70 62 53 63   Resp: 16 16 16 16   Temp: 98.6  F (37  C) 97.8  F (36.6  C) 97.8  F (36.6  C) 97.3  F (36.3  C)   TempSrc: Tympanic Oral Oral Tympanic   SpO2:    98%   Weight:       Height:              Discharge Plan:     Disposition:  [facility] IOP and suboxone maintenance     Treatment Program  [facility]  Start date/time: Friday at 2:30pm 5/24/19  Address: [address] [place] [address]  Phone: [phone]     Follow-up Appointment:   Appointment Date/Time:To schedule on discharge from [facility]    Suboxone maintenance  Psychiatrist/Primary Care Giver: Dr. Da March DO      Address: [facility], [address],[place] [address]      Phone Number: [phone]   Fax number: [phone]      Resources:    AA/NA and Sponsors are excellent resources for support and you can find one at any support group meeting.   http://www.aastpaul.org (then click meetings) This for the [facility] AA meetings  http://a[place].org/meetings/   This is for the [facility] AA meetings  http://www.nam[place].us (then click find a  Meeting) This is for [facility] NA   SMART Recovery - self management for addiction recovery:  www.smartrecHackPad.org  Pathways ~ A Health Crisis Resource & Support Center:  850.422.2359.  https://prescribetoprevent.org/patient-education/videos/  http://www.harmreduction.org  [facility] 990-354-8082  Support Group:  AA/NA and Sponsor/support.  National [place] on Mental Illness (www.[place].cristopher.org): 154.139.8638 or 895-536-0765.  Alcoholics Anonymous (www.alcoholics-anonymous.org): Check your phone book for your local chapter.  Suicide Awareness Voices of Education (SAVE) (www.save.org): 635-700-LTQX (7283)  National Suicide Prevention Line (www.mentalhealthmn.org): 853-994-AJDD (1100)  Mental Health Consumer/Survivor Network of [place] (www.mhcsn.net): 354.837.4322 or 573-477-0245  Mental Health Association of [place] (www.mentalhealth.org): 439.995.6344 or 666-816-5412   Substance Abuse and Mental Health Services (www.samhsa.gov)     [place] Recovery Connection ([facility])  [facility] connects people seeking recovery to resources that help foster and sustain long-term recovery.  Whether you are seeking resources for treatment, transportation, housing, job training, education, health care or other pathways to recovery, [facility] is a great place to start.  392.110.7029.  www.[facility].org    Attestation:  The patient has been seen and evaluated by me,  Boo Khan MD

## 2019-05-23 NOTE — PROGRESS NOTES
"   05/23/19 1800   General Information   Art Directive other (see comments)   AT directive was to create an image of \"self as a landscape\" using chosen drawing materials. Goals of directive: to create a personal self narrative, emotional expression, to identify personal strengths and goals. Pt was a positive participant, focused on task for the full duration of group. Pt created an image of mountains and described the mountains as personal obstacles he needs to overcome \"in order to reach greener pastures.\" Pt referred to a sense of hope in his artwork.  Pts mood was calm.  "

## 2019-05-23 NOTE — PROGRESS NOTES
Case Management Note  5/23/2019    Pt continues to be monitored for abscess to left hand, for pain and swelling.    Benoit Antonio MA, LADC

## 2019-05-23 NOTE — PLAN OF CARE
Pt continues to be monitored for opiate withdrawal, denying withdrawal symptoms. Pt is looking forward to planned discharge for tomorrow to [facility]. Pt has an absess on his left hand thata continues to improve. Still swollen and warm to the touch however significantly improved. Antibiotics extended for patient on discharge, to be ordered by IM. Pt denies SI, active in milieu  Vilma Briscoe RN

## 2019-05-24 VITALS
DIASTOLIC BLOOD PRESSURE: 81 MMHG | OXYGEN SATURATION: 100 % | RESPIRATION RATE: 16 BRPM | TEMPERATURE: 97.8 F | WEIGHT: 195 LBS | HEIGHT: 71 IN | HEART RATE: 65 BPM | SYSTOLIC BLOOD PRESSURE: 135 MMHG | BODY MASS INDEX: 27.3 KG/M2

## 2019-05-24 PROCEDURE — 25000132 ZZH RX MED GY IP 250 OP 250 PS 637: Performed by: PHYSICIAN ASSISTANT

## 2019-05-24 PROCEDURE — 25000132 ZZH RX MED GY IP 250 OP 250 PS 637: Performed by: PSYCHIATRY & NEUROLOGY

## 2019-05-24 PROCEDURE — 25000132 ZZH RX MED GY IP 250 OP 250 PS 637: Performed by: NURSE PRACTITIONER

## 2019-05-24 RX ORDER — CEPHALEXIN 500 MG/1
500 CAPSULE ORAL EVERY 6 HOURS
Qty: 30 CAPSULE | Refills: 0 | Status: SHIPPED | OUTPATIENT
Start: 2019-05-24

## 2019-05-24 RX ADMIN — BUPRENORPHINE HYDROCHLORIDE 4 MG: 2 TABLET SUBLINGUAL at 13:11

## 2019-05-24 RX ADMIN — CEPHALEXIN 500 MG: 500 CAPSULE ORAL at 05:58

## 2019-05-24 RX ADMIN — HYDROXYZINE HYDROCHLORIDE 25 MG: 25 TABLET ORAL at 11:08

## 2019-05-24 RX ADMIN — CEPHALEXIN 500 MG: 500 CAPSULE ORAL at 11:08

## 2019-05-24 RX ADMIN — IBUPROFEN 600 MG: 600 TABLET ORAL at 08:21

## 2019-05-24 RX ADMIN — CEPHALEXIN 500 MG: 500 CAPSULE ORAL at 00:20

## 2019-05-24 RX ADMIN — BUPRENORPHINE HYDROCHLORIDE 4 MG: 2 TABLET SUBLINGUAL at 08:22

## 2019-05-24 NOTE — PROGRESS NOTES
Discharge instructions were given and were explained to the patient who stated that he understood them.  The patient denied any thoughts of self harm.  The patient left the unit at 1330 and was taking a cab to [facility].       .